# Patient Record
Sex: FEMALE | Race: WHITE | Employment: OTHER | URBAN - METROPOLITAN AREA
[De-identification: names, ages, dates, MRNs, and addresses within clinical notes are randomized per-mention and may not be internally consistent; named-entity substitution may affect disease eponyms.]

---

## 2017-01-30 RX ORDER — ATOMOXETINE 60 MG/1
60 CAPSULE ORAL DAILY
COMMUNITY
Start: 2008-06-09 | End: 2017-03-27

## 2017-01-30 RX ORDER — BUPROPION HYDROCHLORIDE 150 MG/1
150 TABLET ORAL 2 TIMES DAILY
COMMUNITY
Start: 2008-02-26 | End: 2017-08-27

## 2017-01-30 RX ORDER — ESTRADIOL 1 MG/1
1 TABLET ORAL DAILY
COMMUNITY
Start: 2013-12-04 | End: 2018-01-07

## 2017-01-30 RX ORDER — BUDESONIDE AND FORMOTEROL FUMARATE DIHYDRATE 160; 4.5 UG/1; UG/1
AEROSOL RESPIRATORY (INHALATION)
COMMUNITY
Start: 2016-08-17 | End: 2017-03-10

## 2017-01-30 RX ORDER — ESOMEPRAZOLE MAGNESIUM 40 MG/1
40 CAPSULE, DELAYED RELEASE ORAL
COMMUNITY
Start: 2008-01-22 | End: 2017-10-13

## 2017-01-30 RX ORDER — ALBUTEROL SULFATE 90 UG/1
2 AEROSOL, METERED RESPIRATORY (INHALATION) EVERY 4 HOURS PRN
COMMUNITY
Start: 2007-07-31 | End: 2017-12-02

## 2017-01-30 RX ORDER — TRAMADOL HYDROCHLORIDE 50 MG/1
50 TABLET ORAL EVERY 8 HOURS PRN
COMMUNITY
Start: 2015-08-14 | End: 2017-04-29

## 2017-01-30 RX ORDER — ALBUTEROL SULFATE 2.5 MG/3ML
2.5 SOLUTION RESPIRATORY (INHALATION) EVERY 4 HOURS PRN
COMMUNITY
Start: 2013-11-11 | End: 2021-08-30

## 2017-01-30 RX ORDER — MONTELUKAST SODIUM 10 MG/1
10 TABLET ORAL DAILY
COMMUNITY
Start: 2007-01-08 | End: 2017-12-28

## 2017-01-30 RX ORDER — ATORVASTATIN CALCIUM 20 MG/1
20 TABLET, FILM COATED ORAL DAILY
COMMUNITY
Start: 2007-01-08 | End: 2017-10-15

## 2017-01-30 RX ORDER — MIRTAZAPINE 30 MG/1
TABLET, FILM COATED ORAL
COMMUNITY
Start: 2013-11-05 | End: 2017-02-08

## 2017-01-30 RX ORDER — SODIUM PHOSPHATE,MONO-DIBASIC 19G-7G/118
ENEMA (ML) RECTAL
COMMUNITY
Start: 2016-06-10 | End: 2017-03-10

## 2017-02-01 RX ORDER — ATOMOXETINE HCL 60 MG
CAPSULE ORAL
Qty: 90 CAPSULE | Refills: 1 | Status: SHIPPED | OUTPATIENT
Start: 2017-02-01 | End: 2017-02-25 | Stop reason: CLARIF

## 2017-02-08 RX ORDER — MIRTAZAPINE 30 MG/1
TABLET, FILM COATED ORAL
Qty: 90 TABLET | Refills: 1 | Status: SHIPPED | OUTPATIENT
Start: 2017-02-08 | End: 2017-08-07

## 2017-02-14 RX ORDER — MELOXICAM 15 MG/1
15 TABLET ORAL DAILY
Refills: 2 | COMMUNITY
Start: 2017-01-05 | End: 2020-01-28

## 2017-02-14 RX ORDER — FLUTICASONE PROPIONATE 50 MCG
1-2 SPRAY, SUSPENSION (ML) NASAL DAILY
Refills: 1 | COMMUNITY
Start: 2016-12-01 | End: 2017-03-01

## 2017-02-14 RX ORDER — FLUTICASONE PROPIONATE 50 MCG
SPRAY, SUSPENSION (ML) NASAL
Qty: 3 BOTTLE | Refills: 3 | Status: SHIPPED | OUTPATIENT
Start: 2017-02-14 | End: 2017-02-25 | Stop reason: CLARIF

## 2017-02-14 RX ORDER — HYDROXYCHLOROQUINE SULFATE 200 MG/1
200 TABLET, FILM COATED ORAL 2 TIMES DAILY
Refills: 2 | COMMUNITY
Start: 2017-02-03 | End: 2018-06-09 | Stop reason: ALTCHOICE

## 2017-02-14 RX ORDER — FLUTICASONE PROPIONATE 50 MCG
SPRAY, SUSPENSION (ML) NASAL
Qty: 3 BOTTLE | Refills: 3 | Status: SHIPPED | OUTPATIENT
Start: 2017-02-14 | End: 2017-02-14

## 2017-02-14 RX ORDER — FOLIC ACID 1 MG/1
1 TABLET ORAL DAILY
Refills: 11 | COMMUNITY
Start: 2017-01-19 | End: 2020-08-31

## 2017-02-14 RX ORDER — ONDANSETRON 8 MG/1
8 TABLET, ORALLY DISINTEGRATING ORAL 3 TIMES DAILY PRN
Refills: 0 | COMMUNITY
Start: 2016-12-27 | End: 2021-09-28

## 2017-02-25 ENCOUNTER — OFFICE VISIT (OUTPATIENT)
Dept: FAMILY MEDICINE CLINIC | Facility: CLINIC | Age: 52
End: 2017-02-25

## 2017-02-25 VITALS
TEMPERATURE: 99 F | DIASTOLIC BLOOD PRESSURE: 88 MMHG | HEIGHT: 64.75 IN | WEIGHT: 283 LBS | HEART RATE: 68 BPM | SYSTOLIC BLOOD PRESSURE: 148 MMHG | BODY MASS INDEX: 47.73 KG/M2

## 2017-02-25 DIAGNOSIS — F34.1 DYSTHYMIA: ICD-10-CM

## 2017-02-25 DIAGNOSIS — G47.33 OBSTRUCTIVE SLEEP APNEA SYNDROME: Primary | ICD-10-CM

## 2017-02-25 DIAGNOSIS — F41.1 ANXIETY STATE: ICD-10-CM

## 2017-02-25 PROCEDURE — 99214 OFFICE O/P EST MOD 30 MIN: CPT | Performed by: FAMILY MEDICINE

## 2017-02-25 NOTE — PROGRESS NOTES
[facility]        HPI:   This is a 46year old female coming in for Patient presents with:  Obstructive Sleep Apnea (FELISA)    HPI  Patient is doing well with cpap. No problems with hoses tubes filters or mask.    Suman Troy Rfl: 3   Meloxicam 15 MG Oral Tab Take 15 mg by mouth daily. Disp:  Rfl: 2   Hydroxychloroquine Sulfate 200 MG Oral Tab Take 200 mg by mouth 2 (two) times daily. Disp:  Rfl: 2   folic acid 1 MG Oral Tab Take 1 mg by mouth daily.  Disp:  Rfl: 11   Fluticason Glucosamine-Chondroitin ([employer] GLUCOSAMINE-CHONDROITIN) 500-400 MG Oral Cap  Disp:  Rfl:       Counseling given: Not Answered         Problem List:  Patient Active Problem List:     Allergic rhinitis     Anxiety state     S/P appendectomy     Asthma     Bu supple. No JVD present. No tracheal deviation present. No thyromegaly present. Mal 1 tonsil rigt 1+ left 0   Neck 19.5   Cardiovascular: Normal rate, regular rhythm, normal heart sounds and intact distal pulses.     Pulmonary/Chest: Effort normal.   Lymph

## 2017-03-01 RX ORDER — FLUTICASONE PROPIONATE 50 MCG
SPRAY, SUSPENSION (ML) NASAL
Qty: 3 BOTTLE | Refills: 3 | Status: SHIPPED | OUTPATIENT
Start: 2017-03-01 | End: 2017-07-10

## 2017-03-10 ENCOUNTER — TELEPHONE (OUTPATIENT)
Dept: FAMILY MEDICINE CLINIC | Facility: CLINIC | Age: 52
End: 2017-03-10

## 2017-03-10 ENCOUNTER — OFFICE VISIT (OUTPATIENT)
Dept: FAMILY MEDICINE CLINIC | Facility: CLINIC | Age: 52
End: 2017-03-10

## 2017-03-10 VITALS
OXYGEN SATURATION: 100 % | HEIGHT: 64.75 IN | SYSTOLIC BLOOD PRESSURE: 150 MMHG | BODY MASS INDEX: 47.67 KG/M2 | DIASTOLIC BLOOD PRESSURE: 92 MMHG | HEART RATE: 95 BPM | WEIGHT: 282.63 LBS | TEMPERATURE: 99 F

## 2017-03-10 DIAGNOSIS — J45.901 ASTHMA EXACERBATION: ICD-10-CM

## 2017-03-10 DIAGNOSIS — J01.00 ACUTE NON-RECURRENT MAXILLARY SINUSITIS: Primary | ICD-10-CM

## 2017-03-10 DIAGNOSIS — H10.9 BACTERIAL CONJUNCTIVITIS OF LEFT EYE: ICD-10-CM

## 2017-03-10 PROCEDURE — 94150 VITAL CAPACITY TEST: CPT | Performed by: NURSE PRACTITIONER

## 2017-03-10 PROCEDURE — 99214 OFFICE O/P EST MOD 30 MIN: CPT | Performed by: NURSE PRACTITIONER

## 2017-03-10 RX ORDER — POLYMYXIN B SULFATE AND TRIMETHOPRIM 1; 10000 MG/ML; [USP'U]/ML
1 SOLUTION OPHTHALMIC EVERY 6 HOURS
Qty: 10 ML | Refills: 0 | Status: SHIPPED | OUTPATIENT
Start: 2017-03-10 | End: 2017-03-17

## 2017-03-10 RX ORDER — METHYLPREDNISOLONE 4 MG/1
TABLET ORAL
Qty: 1 KIT | Refills: 0 | Status: SHIPPED | OUTPATIENT
Start: 2017-03-10 | End: 2017-11-28 | Stop reason: ALTCHOICE

## 2017-03-10 RX ORDER — DOXYCYCLINE HYCLATE 100 MG
100 TABLET ORAL 2 TIMES DAILY
Qty: 20 TABLET | Refills: 0 | Status: SHIPPED | OUTPATIENT
Start: 2017-03-10 | End: 2017-03-20

## 2017-03-10 RX ORDER — POLYMYXIN B SULFATE AND TRIMETHOPRIM 1; 10000 MG/ML; [USP'U]/ML
1 SOLUTION OPHTHALMIC EVERY 6 HOURS
Qty: 10 ML | Refills: 0 | Status: SHIPPED | OUTPATIENT
Start: 2017-03-10 | End: 2017-03-10

## 2017-03-10 NOTE — PROGRESS NOTES
CHIEF COMPLAINT:   Patient presents with:  Nasal Congestion: pressure   Cough      HPI:   Josefina Dubois is a 46year old female who presents for cold symptoms for  9  days. Symptoms have progressed into sinus congestion and been worsening since onset.  Sin needed. Disp:  Rfl:    Albuterol Sulfate HFA (PROAIR HFA) 108 (90 Base) MCG/ACT Inhalation Aero Soln Inhale 2 puffs into the lungs every 4 (four) hours as needed. Disp:  Rfl:    Atomoxetine HCl (STRATTERA) 60 MG Oral Cap Take 60 mg by mouth daily.    Pola Branhc REVIEW OF SYSTEMS:   GENERAL:  Slightly decreased appetite  SKIN: no rashes or abnormal skin lesions  HEENT: See HPI. LUNGS:  See HPI  CARDIOVASCULAR: denies chest pain or palpitations   GI: denies N/V/C or abdominal pain  NEURO: + sinus headaches. once a day for 4 days. methylPREDNISolone (MEDROL) 4 MG Oral Tablet Therapy Pack 1 kit 0      Sig: As directed. Doxycycline Hyclate 100 MG Oral Tab 20 tablet 0      Sig: Take 1 tablet (100 mg total) by mouth 2 (two) times daily.  For ten days, casey

## 2017-03-10 NOTE — PATIENT INSTRUCTIONS
Push fluids, rest.  Antibiotic as prescribed, take with food. Medrol dose pack as prescribed, take with food. Antibiotic eye drops to eyes as prescribed x 5 days. Hand hygiene important as pink eye is very contagious. Flonase nasal spray as prescribed.

## 2017-03-13 ENCOUNTER — OFFICE VISIT (OUTPATIENT)
Dept: FAMILY MEDICINE CLINIC | Facility: CLINIC | Age: 52
End: 2017-03-13

## 2017-03-13 VITALS
WEIGHT: 278 LBS | BODY MASS INDEX: 47.46 KG/M2 | HEIGHT: 64 IN | DIASTOLIC BLOOD PRESSURE: 92 MMHG | SYSTOLIC BLOOD PRESSURE: 144 MMHG | RESPIRATION RATE: 16 BRPM | TEMPERATURE: 99 F | OXYGEN SATURATION: 99 % | HEART RATE: 108 BPM

## 2017-03-13 DIAGNOSIS — Z09 FOLLOW UP: ICD-10-CM

## 2017-03-13 DIAGNOSIS — J45.901 ASTHMA WITH ACUTE EXACERBATION, UNSPECIFIED ASTHMA SEVERITY: Primary | ICD-10-CM

## 2017-03-13 PROCEDURE — 94150 VITAL CAPACITY TEST: CPT | Performed by: NURSE PRACTITIONER

## 2017-03-13 PROCEDURE — 99213 OFFICE O/P EST LOW 20 MIN: CPT | Performed by: NURSE PRACTITIONER

## 2017-03-13 NOTE — PATIENT INSTRUCTIONS
Pt to continue with current plan of care and finish all antibiotic courses and medrol dose pack as prescribed. Peak flow ordered for home use, use once this episode is resolved to assess baseline peak flow. Low salt diet recommended.   Return if symptoms

## 2017-03-13 NOTE — PROGRESS NOTES
CHIEF COMPLAINT:   Patient presents with: Follow - Up: sinus, URI      HPI:   Angel Franks is a 46year old female who presents for follow up on bacterial conjunctivitis L eye, sinusitis, and asthma exacerbation.  Tx doxycyline, medrol dose pack, and yaneli puffs into the lungs every 4 (four) hours as needed. Disp:  Rfl:    Atomoxetine HCl (STRATTERA) 60 MG Oral Cap Take 60 mg by mouth daily. Disp:  Rfl:    Atorvastatin Calcium (LIPITOR) 20 MG Oral Tab Take 20 mg by mouth daily.    Disp:  Rfl:    BuPROPion HPI    EXAM:   /92 mmHg  Pulse 108  Temp(Src) 98.6 °F (37 °C) (Tympanic)  Resp 16  Ht 64\"  Wt 278 lb  BMI 47.70 kg/m2  SpO2 99%  GENERAL: well developed, well nourished,in no apparent distress  SKIN: no rashes,no suspicious lesions  HEAD: atraumatic 260--which is improved from last visit. Pt will get home peak flow to assess baseline and to use for future exacerbations to assess pulmonary fxn. All questions answered, no new concerns noted.     The patient indicates understanding of these issues and

## 2017-03-28 RX ORDER — ATOMOXETINE HCL 60 MG
CAPSULE ORAL
Qty: 90 CAPSULE | Refills: 1 | Status: SHIPPED | OUTPATIENT
Start: 2017-03-28 | End: 2017-12-04

## 2017-04-18 ENCOUNTER — HOSPITAL ENCOUNTER (OUTPATIENT)
Dept: GENERAL RADIOLOGY | Age: 52
Discharge: HOME | End: 2017-04-18
Attending: NURSE PRACTITIONER
Payer: COMMERCIAL

## 2017-04-18 ENCOUNTER — TELEPHONE (OUTPATIENT)
Dept: FAMILY MEDICINE CLINIC | Facility: CLINIC | Age: 52
End: 2017-04-18

## 2017-04-18 ENCOUNTER — OFFICE VISIT (OUTPATIENT)
Dept: FAMILY MEDICINE CLINIC | Facility: CLINIC | Age: 52
End: 2017-04-18

## 2017-04-18 VITALS
TEMPERATURE: 99 F | DIASTOLIC BLOOD PRESSURE: 98 MMHG | BODY MASS INDEX: 49 KG/M2 | HEART RATE: 79 BPM | WEIGHT: 286.63 LBS | SYSTOLIC BLOOD PRESSURE: 142 MMHG

## 2017-04-18 DIAGNOSIS — M25.571 ACUTE RIGHT ANKLE PAIN: ICD-10-CM

## 2017-04-18 DIAGNOSIS — M25.571 ACUTE RIGHT ANKLE PAIN: Primary | ICD-10-CM

## 2017-04-18 DIAGNOSIS — R60.0 LOCALIZED EDEMA: ICD-10-CM

## 2017-04-18 PROCEDURE — 99214 OFFICE O/P EST MOD 30 MIN: CPT | Performed by: NURSE PRACTITIONER

## 2017-04-18 PROCEDURE — 73610 X-RAY EXAM OF ANKLE: CPT

## 2017-04-18 NOTE — PROGRESS NOTES
Bobby Terry is a 46year old female. Patient presents with:  Pain: right leg, right knee  Leg Swelling      HPI:   Complaints of pain and swelling to right lower leg  Started with ankle pain - then swelling started on Sunday   No recent travel.  No rece (PROAIR HFA) 108 (90 Base) MCG/ACT Inhalation Aero Soln Inhale 2 puffs into the lungs every 4 (four) hours as needed. Disp:  Rfl:    Atorvastatin Calcium (LIPITOR) 20 MG Oral Tab Take 20 mg by mouth daily.    Disp:  Rfl:    BuPROPion HCl ER, XL, [facility] SHADYSIDE-ER REVIEW OF SYSTEMS:   GENERAL HEALTH: feels well otherwise  HEENT: denies complaints  SKIN: denies any unusual skin lesions or rashes  RESPIRATORY: denies shortness of breath with exertion, no cough  CARDIOVASCULAR: denies chest pain on exertion, no

## 2017-04-18 NOTE — PATIENT INSTRUCTIONS
Go to [facility] for an ultrasound of your right lower leg to rule out a blood clot. If you have shortness of breath, chest pain/pressure, severe headache, weakness in happier body, slurred speech, vision changes, etc.–call 911.     If ultrasound

## 2017-05-01 RX ORDER — TRAMADOL HYDROCHLORIDE 50 MG/1
TABLET ORAL
Qty: 30 TABLET | Refills: 0 | Status: SHIPPED | OUTPATIENT
Start: 2017-05-01 | End: 2018-06-14

## 2017-05-01 NOTE — TELEPHONE ENCOUNTER
Last Labs:  12/27/2016  Last OV:  8/16/2016    Future Appointments  Date Time Provider Marlene Bronson   5/24/2017 5:30 PM Nabil Quiros [facility] Tory Cobos, 05/01/2017, 8:10 AM

## 2017-05-30 ENCOUNTER — OFFICE VISIT (OUTPATIENT)
Dept: FAMILY MEDICINE CLINIC | Facility: CLINIC | Age: 52
End: 2017-05-30

## 2017-05-30 VITALS
DIASTOLIC BLOOD PRESSURE: 82 MMHG | TEMPERATURE: 97 F | HEART RATE: 90 BPM | SYSTOLIC BLOOD PRESSURE: 144 MMHG | BODY MASS INDEX: 48.83 KG/M2 | HEIGHT: 64 IN | WEIGHT: 286 LBS | RESPIRATION RATE: 18 BRPM

## 2017-05-30 DIAGNOSIS — H65.93 BILATERAL NON-SUPPURATIVE OTITIS MEDIA: Primary | ICD-10-CM

## 2017-05-30 DIAGNOSIS — J45.30 MILD PERSISTENT ASTHMA WITHOUT COMPLICATION: ICD-10-CM

## 2017-05-30 PROBLEM — H66.90 OTITIS MEDIA: Status: ACTIVE | Noted: 2017-05-30

## 2017-05-30 PROCEDURE — 99213 OFFICE O/P EST LOW 20 MIN: CPT | Performed by: FAMILY MEDICINE

## 2017-05-30 RX ORDER — FEXOFENADINE HCL AND PSEUDOEPHEDRINE HCI 180; 240 MG/1; MG/1
1 TABLET, EXTENDED RELEASE ORAL DAILY
Qty: 10 TABLET | Refills: 0 | Status: SHIPPED | OUTPATIENT
Start: 2017-05-30 | End: 2018-08-14

## 2017-05-30 RX ORDER — CEFUROXIME AXETIL 250 MG/1
250 TABLET ORAL 2 TIMES DAILY
Qty: 20 TABLET | Refills: 0 | Status: SHIPPED | OUTPATIENT
Start: 2017-05-30 | End: 2017-11-28 | Stop reason: ALTCHOICE

## 2017-05-31 NOTE — PROGRESS NOTES
[address]  PROGRESS NOTE  Chief Complaint:   Patient presents with:  Ear Problem: trouble hearing on left side- moving to right side. Slight pain. HPI:   This is a 46year old female coming in for discomfort in her left ear.   She 1   FLUTICASONE PROPIONATE 50 MCG/ACT Nasal Suspension SPRAY 1 TO 2 SPRAYS IN EACH NOSTRIL EVERY DAY Disp: 3 Bottle Rfl: 3   ondansetron 8 MG Oral Tablet Dispersible Take 8 mg by mouth 3 (three) times daily as needed.  Disp:  Rfl: 0   methotrexate 2.5 MG Or Counseling given: Not Answered       REVIEW OF SYSTEMS:   CONSTITUTIONAL:  Denies unusual weight gain/loss, fever, chills, or fatigue. EENT:  Eyes:  Denies eye pain, visual loss, blurred vision, double vision or yellow sclerae.  Ears, Nose, Throat: See H Nose: patent, no nasal discharge Throat:  No tonsillar erythema or exudate. Mouth:  No oral lesions or ulcerations, good dentition. There is mild tenderness across the front part of the face. No specific point tenderness noted.   NECK: Supple, no CLAD, n lipoprotein-type hyperlipidemia     Low back pain     Thoracic neuritis     Symptomatic menopausal or female climacteric states     Obesity     Osteoarthrosis     Sciatica     Sleep apnea     S/P CHRISTINA-BSO (total abdominal hysterectomy and bilateral salpingo

## 2017-07-07 RX ORDER — MOMETASONE FUROATE 220 UG/1
INHALANT RESPIRATORY (INHALATION)
Qty: 1 INHALER | Refills: 0 | Status: SHIPPED | OUTPATIENT
Start: 2017-07-07 | End: 2017-10-09

## 2017-07-10 RX ORDER — FLUTICASONE PROPIONATE 50 MCG
SPRAY, SUSPENSION (ML) NASAL
Qty: 1 BOTTLE | Refills: 6 | Status: SHIPPED | OUTPATIENT
Start: 2017-07-10 | End: 2018-01-29

## 2017-07-18 ENCOUNTER — TELEPHONE (OUTPATIENT)
Dept: FAMILY MEDICINE CLINIC | Facility: CLINIC | Age: 52
End: 2017-07-18

## 2017-07-18 NOTE — TELEPHONE ENCOUNTER
[employer] Approved 5/5/17-7/14/2018.   Phone: [phone]  Fax:    [phone]  Member ID: [id number]

## 2017-08-07 RX ORDER — MIRTAZAPINE 30 MG/1
TABLET, FILM COATED ORAL
Qty: 90 TABLET | Refills: 1 | Status: SHIPPED | OUTPATIENT
Start: 2017-08-07 | End: 2018-02-08

## 2017-08-28 RX ORDER — BUPROPION HYDROCHLORIDE 150 MG/1
TABLET ORAL
Qty: 180 TABLET | Refills: 1 | Status: SHIPPED | OUTPATIENT
Start: 2017-08-28 | End: 2018-02-18

## 2017-08-28 NOTE — TELEPHONE ENCOUNTER
Last Labs:  12/27/2016  Last OV:  8/16/2016  Last RF:  8/13/2016  No future appointments.   Chey Flores, 08/28/17, 7:20 AM

## 2017-10-09 RX ORDER — MOMETASONE FUROATE 220 UG/1
INHALANT RESPIRATORY (INHALATION)
Qty: 3 INHALER | Refills: 3 | Status: SHIPPED | OUTPATIENT
Start: 2017-10-09 | End: 2018-11-06

## 2017-10-09 NOTE — TELEPHONE ENCOUNTER
Future appt:  None  Last Appointment:  5/30/2017; No f/u recommended     No results found for: CHOLEST, HDL, LDL, TRIGLY, TRIG  No results found for: EAG, A1C  No results found for: T4F, TSH, TSHT4    Last Labs:  12/27/2016  Last RF:  7/7/2017    No Follow

## 2017-10-13 RX ORDER — ESOMEPRAZOLE MAGNESIUM 40 MG/1
CAPSULE, DELAYED RELEASE ORAL
Qty: 90 CAPSULE | Refills: 0 | Status: SHIPPED | OUTPATIENT
Start: 2017-10-13 | End: 2018-01-08

## 2017-10-13 NOTE — TELEPHONE ENCOUNTER
Future appt:  None  Last Appointment:  8/16/2016;  Return to clinic in a year  Suggested date of next visit: 08/16/2017    No results found for: CHOLEST, HDL, LDL, TRIGLY, TRIG  No results found for: EAG, A1C  No results found for: T4F, TSH, TSHT4    Last

## 2017-10-16 RX ORDER — ATORVASTATIN CALCIUM 20 MG/1
TABLET, FILM COATED ORAL
Qty: 90 TABLET | Refills: 0 | Status: SHIPPED | OUTPATIENT
Start: 2017-10-16 | End: 2017-12-04

## 2017-11-28 ENCOUNTER — OFFICE VISIT (OUTPATIENT)
Dept: FAMILY MEDICINE CLINIC | Facility: CLINIC | Age: 52
End: 2017-11-28

## 2017-11-28 VITALS
HEART RATE: 74 BPM | HEIGHT: 64 IN | DIASTOLIC BLOOD PRESSURE: 72 MMHG | SYSTOLIC BLOOD PRESSURE: 118 MMHG | WEIGHT: 288 LBS | BODY MASS INDEX: 49.17 KG/M2 | TEMPERATURE: 99 F

## 2017-11-28 DIAGNOSIS — J02.9 PHARYNGITIS, UNSPECIFIED ETIOLOGY: Primary | ICD-10-CM

## 2017-11-28 DIAGNOSIS — K52.9 GASTROENTERITIS: ICD-10-CM

## 2017-11-28 PROCEDURE — 87081 CULTURE SCREEN ONLY: CPT | Performed by: NURSE PRACTITIONER

## 2017-11-28 PROCEDURE — 87880 STREP A ASSAY W/OPTIC: CPT | Performed by: NURSE PRACTITIONER

## 2017-11-28 PROCEDURE — 99214 OFFICE O/P EST MOD 30 MIN: CPT | Performed by: NURSE PRACTITIONER

## 2017-11-28 RX ORDER — CERTOLIZUMAB PEGOL 400 MG
KIT SUBCUTANEOUS
COMMUNITY
Start: 2017-10-19 | End: 2019-10-28 | Stop reason: ALTCHOICE

## 2017-11-28 RX ORDER — CEPHALEXIN 500 MG/1
500 CAPSULE ORAL 2 TIMES DAILY
Qty: 20 CAPSULE | Refills: 0 | Status: SHIPPED | OUTPATIENT
Start: 2017-11-28 | End: 2018-06-09 | Stop reason: ALTCHOICE

## 2017-11-28 NOTE — PROGRESS NOTES
Chief complaint:  Patient presents with:  Sore Throat: x 6 days - denies fever  Ear Pain: x 6 days  Vomiting: x 4 days  Diarrhea: x 4 days      HPI:   Sachi Gibson is a 45year old female who presents for upper respiratory symptoms for 6  days.   Complain BUPROPION HCL ER, XL, 150 MG Oral Tablet 24 Hr TAKE 1 TABLET TWICE A DAY Disp: 180 tablet Rfl: 1   MIRTAZAPINE 30 MG Oral Tab TAKE 1 TABLET BY MOUTH EVERY NIGHT AT BEDTIME Disp: 90 tablet Rfl: 1   FLUTICASONE PROPIONATE 50 MCG/ACT Nasal Suspension SHAKE LI • Obesity    • Sleep apnea       Past Surgical History:  No date: APPENDECTOMY  No date:   No date: HYSTERECTOMY   No family history on file.    Smoking status: Never Smoker                                                              Smokeless tob Kit Expiration Date 5/31/2019 Date       Strep throat cx pending. ASSESSMENT AND PLAN:   Deng Gonsales is a 45year old female who presents with Sore Throat (x 6 days - denies fever); Ear Pain (x 6 days); Vomiting (x 4 days); and Diarrhea (x 4 days).  Hortensia Kellogg Gargling every hour or 2 can ease irritation.  Try gargling with 1 of these solutions:  · 1/4 teaspoon of salt in 1/2 cup of warm water  · An over-the-counter anesthetic gargle  Use medicine for more relief  Over-the-counter medicine can reduce sore throat Gastroenteritis is commonly called the stomach flu. It is most often caused by a virus that affects the stomach and intestinal tract and usually lasts from 2 to 7 days. Common viruses causing gastroenteritis include norovirus, rotavirus, and hepatitis A.  Alexandrea Montelongo You may use acetaminophen or NSAID medicines like ibuprofen or naproxen to control fever unless another medicine was given. If you have chronic liver or kidney disease, talk with your healthcare provider before using these medicines.  Also talk with your pr · Limit fat intake to less than 15 grams per day. Do this by avoiding margarine, butter, oils, mayonnaise, sauces, gravies, fried foods, peanut butter, meat, poultry, and fish.   · Limit fiber and avoid raw or cooked vegetables, fresh fruits (except bananas Rapid strep negative, strep throat cx pending. The patient indicates understanding of these issues and agrees to the plan. The patient is asked to return with PCP if sx's persist or worsen.     LAURA Pennington

## 2017-11-28 NOTE — PATIENT INSTRUCTIONS
Push fluids, rest. Hydration is important with water and low sugar gatorade. Diarrhea should improve over the next few days, do NOT recommend imodium at this time. Antibiotic as prescribed, take with food. Anderson diet and progress as tolerated.    Tylenol · Stop smoking or reduce contact with secondhand smoke. Smoke irritates the tender throat lining. · Limit contact with pets and with allergy-causing substances, such as pollen and mold.   · When you’re around someone with a sore throat or cold, wash your h Gastroenteritis is transmitted by contact with the stool or vomit of an infected person. This can occur from person to person or from contact with a contaminated surface.   Follow these guidelines when caring for yourself at home:  · If symptoms are severe, · If you eat, avoid fatty, greasy, spicy, or fried foods. · Don't eat dairy if you have diarrhea. This can make diarrhea worse. · Avoid tobacco, alcohol, and caffeine which may worsen symptoms.   During the first 24 hours (the first full day), follow the Call 911 if any of these occur:  · Trouble breathing  · Chest pain  · Confused  · Severe drowsiness or trouble awakening  · Fainting or loss of consciousness  · Rapid heart rate  · Seizure  · Stiff neck  When to seek medical advice  Call your healthcare pr

## 2017-12-02 RX ORDER — ALBUTEROL SULFATE 90 UG/1
2 AEROSOL, METERED RESPIRATORY (INHALATION) EVERY 4 HOURS PRN
Qty: 3 INHALER | Refills: 3 | Status: SHIPPED | OUTPATIENT
Start: 2017-12-02 | End: 2019-05-07

## 2017-12-02 NOTE — TELEPHONE ENCOUNTER
Future appt:    Last Appointment:  05/30/2017  No results found for: CHOLEST, HDL, LDL, TRIGLY, TRIG  No results found for: EAG, A1C  No results found for: T4F, TSH, TSHT4    No Follow-up on file.

## 2017-12-05 RX ORDER — ATORVASTATIN CALCIUM 20 MG/1
TABLET, FILM COATED ORAL
Qty: 90 TABLET | Refills: 0 | Status: SHIPPED | OUTPATIENT
Start: 2017-12-05 | End: 2018-06-09

## 2017-12-05 RX ORDER — ATOMOXETINE 60 MG/1
CAPSULE ORAL
Qty: 90 CAPSULE | Refills: 0 | Status: SHIPPED | OUTPATIENT
Start: 2017-12-05 | End: 2018-06-04

## 2017-12-05 NOTE — TELEPHONE ENCOUNTER
Future appt:    Last Appointment:  5/30/2017   Last Physical 8/16/2016  No results found for: CHOLEST, HDL, LDL, TRIGLY, TRIG  8/14/2016  No results found for: EAG, A1C  No results found for: T4F, TSH, TSHT4    No Follow-up on file.    M/L due for Fasting L

## 2017-12-30 RX ORDER — MONTELUKAST SODIUM 10 MG/1
TABLET ORAL
Qty: 90 TABLET | Refills: 3 | Status: SHIPPED | OUTPATIENT
Start: 2017-12-30 | End: 2018-11-14

## 2018-01-08 RX ORDER — ESTRADIOL 1 MG/1
TABLET ORAL
Qty: 90 TABLET | Refills: 3 | Status: SHIPPED | OUTPATIENT
Start: 2018-01-08 | End: 2018-12-28

## 2018-01-08 RX ORDER — ESOMEPRAZOLE MAGNESIUM 40 MG/1
CAPSULE, DELAYED RELEASE ORAL
Qty: 90 CAPSULE | Refills: 3 | Status: SHIPPED | OUTPATIENT
Start: 2018-01-08 | End: 2019-01-05

## 2018-01-08 NOTE — TELEPHONE ENCOUNTER
Future appt:  None  Last Appointment:  5/30/2017; No f/u recommended     No results found for: CHOLEST, HDL, LDL, TRIGLY, TRIG  No results found for: EAG, A1C  No results found for: T4F, TSH, TSHT4     Last Labs:  12/27/2016  Last RF:  1/3/2017     No Foll

## 2018-01-08 NOTE — TELEPHONE ENCOUNTER
Future appt:  None  Last Appointment:  5/30/2017; No f/u recommended    No results found for: CHOLEST, HDL, LDL, TRIGLY, TRIG  No results found for: EAG, A1C  No results found for: T4F, TSH, TSHT4    Last Labs:  12/27/2016  Last RF:  1/3/2017    No Follow-

## 2018-01-29 RX ORDER — FLUTICASONE PROPIONATE 50 MCG
SPRAY, SUSPENSION (ML) NASAL
Qty: 3 BOTTLE | Refills: 1 | Status: SHIPPED | OUTPATIENT
Start: 2018-01-29 | End: 2018-06-09

## 2018-01-29 NOTE — TELEPHONE ENCOUNTER
Future appt:    Last Appointment:  5/30/2017  No results found for: CHOLEST, HDL, LDL, TRIGLY, TRIG  No results found for: EAG, A1C  No results found for: T4F, TSH, TSHT4    No Follow-up on file.

## 2018-02-08 RX ORDER — MIRTAZAPINE 30 MG/1
TABLET, FILM COATED ORAL
Qty: 90 TABLET | Refills: 0 | Status: SHIPPED | OUTPATIENT
Start: 2018-02-08 | End: 2018-05-07

## 2018-02-08 NOTE — TELEPHONE ENCOUNTER
Future appt:  None  Last Appointment:  5/30/2017; No f/u recommended    No results found for: CHOLEST, HDL, LDL, TRIGLY, TRIG  No results found for: EAG, A1C  No results found for: T4F, TSH, TSHT4    Last Labs:  12/27/2016  Last RF:  8/7/2017    No Follow-

## 2018-02-19 NOTE — TELEPHONE ENCOUNTER
Future appt:  None  Last Appointment:  5/30/2017; No f/u recommended    No results found for: CHOLEST, HDL, LDL, TRIGLY, TRIG  No results found for: EAG, A1C  No results found for: T4F, TSH, TSHT4    Last Labs:  12/27/2016  Last RF:  12/5/2017 and 8/28/291

## 2018-02-20 RX ORDER — ATORVASTATIN CALCIUM 20 MG/1
20 TABLET, FILM COATED ORAL DAILY
Qty: 90 TABLET | Refills: 1 | Status: SHIPPED | OUTPATIENT
Start: 2018-02-20 | End: 2019-12-10

## 2018-02-20 RX ORDER — BUPROPION HYDROCHLORIDE 150 MG/1
150 TABLET ORAL 2 TIMES DAILY
Qty: 180 TABLET | Refills: 1 | Status: SHIPPED | OUTPATIENT
Start: 2018-02-20 | End: 2020-08-31

## 2018-04-18 ENCOUNTER — OFFICE VISIT (OUTPATIENT)
Dept: FAMILY MEDICINE CLINIC | Facility: CLINIC | Age: 53
End: 2018-04-18

## 2018-04-18 VITALS
BODY MASS INDEX: 51 KG/M2 | SYSTOLIC BLOOD PRESSURE: 136 MMHG | OXYGEN SATURATION: 98 % | TEMPERATURE: 98 F | WEIGHT: 293 LBS | DIASTOLIC BLOOD PRESSURE: 88 MMHG | HEART RATE: 97 BPM

## 2018-04-18 DIAGNOSIS — B02.8 HERPES ZOSTER WITH COMPLICATION: Primary | ICD-10-CM

## 2018-04-18 PROCEDURE — 87798 DETECT AGENT NOS DNA AMP: CPT | Performed by: NURSE PRACTITIONER

## 2018-04-18 PROCEDURE — 99214 OFFICE O/P EST MOD 30 MIN: CPT | Performed by: NURSE PRACTITIONER

## 2018-04-18 RX ORDER — VALACYCLOVIR HYDROCHLORIDE 1 G/1
1 TABLET, FILM COATED ORAL 3 TIMES DAILY
Qty: 21 TABLET | Refills: 0 | Status: SHIPPED | OUTPATIENT
Start: 2018-04-18 | End: 2018-04-25

## 2018-04-18 NOTE — PROGRESS NOTES
CHIEF COMPLAINT:   Patient presents with:  Pain: \"face pain\" blisters on mouth      HPI:   Jacklyn Gil is a 45year old female who presents to clinic today with complaints of pain and itching to left eye, then tingling down left side of face now bli MG Oral Tab Take 1 tablet (20 mg total) by mouth daily.  Disp: 90 tablet Rfl: 1   ATOMOXETINE HCL 60 MG Oral Cap TAKE 1 CAPSULE DAILY Disp: 90 capsule Rfl: 0   Albuterol Sulfate HFA (PROAIR HFA) 108 (90 Base) MCG/ACT Inhalation Aero Soln Inhale 2 puffs into size, no nodules  LUNGS: No cough, shortness of breath, or wheezing. CARDIOVASCULAR: No chest pain, palpitations  GI: No N/V/C/D.   NEURO: denies complaints    EXAM:   /88 (BP Location: Left arm, Patient Position: Sitting, Cuff Size: adult)   Pulse 9 post herpetic neuralgia and encouraged to follow up if pain persists after rash subsides. Patient voiced understanding and is in agreement with treatment plan.         Meds & Refills for this Visit:    Signed Prescriptions Disp Refills    Frederich Crigler

## 2018-04-23 ENCOUNTER — TELEPHONE (OUTPATIENT)
Dept: FAMILY MEDICINE CLINIC | Facility: CLINIC | Age: 53
End: 2018-04-23

## 2018-04-23 NOTE — TELEPHONE ENCOUNTER
Patient informed of recommendations. Expressed understanding. Patient states she did see the eye doctor. Was given a gel rx for eye     Form placed at  for patient to .

## 2018-04-23 NOTE — TELEPHONE ENCOUNTER
Was seen by Godfrey Gan recently. Has shingles--requesting a note for work for a couple days. Please call back.

## 2018-04-23 NOTE — TELEPHONE ENCOUNTER
Patient states she saw Jayson Osorio last week and was dx with shingles. All over her face.    Has been home on work injury and has been released back to work but patient does not want to go back to work with it [address] over her face  Patient is requesting a work exc

## 2018-04-23 NOTE — TELEPHONE ENCOUNTER
Note printed please give a copy to patient -also please asked patient if she saw the eye doctor for an evaluation if not she needs to see the eye doctor soon as possible for the shingles.

## 2018-04-23 NOTE — TELEPHONE ENCOUNTER
\"Please excuse Sanchez Bennett from work until her shingles rash has dried. She is contagious until lesions have crusted over. \"  Is this ok for her letter.

## 2018-04-26 ENCOUNTER — TELEPHONE (OUTPATIENT)
Dept: FAMILY MEDICINE CLINIC | Facility: CLINIC | Age: 53
End: 2018-04-26

## 2018-04-26 NOTE — TELEPHONE ENCOUNTER
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 Had questions regarding dx, meds, symptoms, office notes, information  Given to nurse.    Expressed understanding

## 2018-04-27 ENCOUNTER — TELEPHONE (OUTPATIENT)
Dept: FAMILY MEDICINE CLINIC | Facility: CLINIC | Age: 53
End: 2018-04-27

## 2018-04-27 NOTE — TELEPHONE ENCOUNTER
Patient having a lot of left ear pain. States keeping Her up at night. Saw Tasneem Graham 4/18 for Shingles. Please advise.   Yesica Jones, 04/27/18, 8:44 AM

## 2018-04-27 NOTE — TELEPHONE ENCOUNTER
Ear pain can be associated with the shingles. I would recommend using over-the-counter hydrocortisone cream on the area if she can get to it. If not sometimes drops of oil in the ear are helpful for that type of pain.   If the pain continues to get progre

## 2018-05-07 RX ORDER — MIRTAZAPINE 30 MG/1
TABLET, FILM COATED ORAL
Qty: 90 TABLET | Refills: 0 | Status: SHIPPED | OUTPATIENT
Start: 2018-05-07 | End: 2018-08-03

## 2018-05-07 NOTE — TELEPHONE ENCOUNTER
Future appt:    Last Appointment:  5/30/2017; No f/u recommended    No results found for: CHOLEST, HDL, LDL, TRIGLY, TRIG  No results found for: EAG, A1C  No results found for: T4F, TSH, TSHT4    Last RF:  2/8/2018    No Follow-up on file.

## 2018-05-07 NOTE — TELEPHONE ENCOUNTER
M/L on VM Patient due for Appt with Dr Annia Cueva to continue refills.   Nichole Araiza, 05/07/18, 3:10 PM

## 2018-06-04 NOTE — TELEPHONE ENCOUNTER
Future appt:    Last Appointment:  5/30/2017; No f/u recommended    No results found for: CHOLEST, HDL, LDL, TRIGLY, TRIG  No results found for: EAG, A1C  No results found for: T4F, TSH, TSHT4    Last RF:  12/5/2017    No Follow-up on file.

## 2018-06-05 RX ORDER — ATOMOXETINE 60 MG/1
CAPSULE ORAL
Qty: 90 CAPSULE | Refills: 0 | Status: SHIPPED | OUTPATIENT
Start: 2018-06-05 | End: 2018-09-17

## 2018-06-09 ENCOUNTER — OFFICE VISIT (OUTPATIENT)
Dept: FAMILY MEDICINE CLINIC | Facility: CLINIC | Age: 53
End: 2018-06-09

## 2018-06-09 ENCOUNTER — LAB ENCOUNTER (OUTPATIENT)
Dept: LAB | Age: 53
End: 2018-06-09
Attending: FAMILY MEDICINE
Payer: COMMERCIAL

## 2018-06-09 VITALS
BODY MASS INDEX: 50.02 KG/M2 | DIASTOLIC BLOOD PRESSURE: 78 MMHG | WEIGHT: 293 LBS | HEART RATE: 108 BPM | HEIGHT: 64 IN | RESPIRATION RATE: 20 BRPM | SYSTOLIC BLOOD PRESSURE: 152 MMHG | TEMPERATURE: 98 F

## 2018-06-09 DIAGNOSIS — E78.49 FAMILIAL MULTIPLE LIPOPROTEIN-TYPE HYPERLIPIDEMIA: ICD-10-CM

## 2018-06-09 DIAGNOSIS — M05.79 RHEUMATOID ARTHRITIS INVOLVING MULTIPLE SITES WITH POSITIVE RHEUMATOID FACTOR (HCC): ICD-10-CM

## 2018-06-09 DIAGNOSIS — M54.50 CHRONIC MIDLINE LOW BACK PAIN WITHOUT SCIATICA: ICD-10-CM

## 2018-06-09 DIAGNOSIS — G47.33 OBSTRUCTIVE SLEEP APNEA SYNDROME: ICD-10-CM

## 2018-06-09 DIAGNOSIS — E55.9 VITAMIN D DEFICIENCY: ICD-10-CM

## 2018-06-09 DIAGNOSIS — G89.29 CHRONIC MIDLINE LOW BACK PAIN WITHOUT SCIATICA: ICD-10-CM

## 2018-06-09 DIAGNOSIS — M15.9 PRIMARY OSTEOARTHRITIS INVOLVING MULTIPLE JOINTS: ICD-10-CM

## 2018-06-09 DIAGNOSIS — J45.40 MODERATE PERSISTENT ASTHMA WITHOUT COMPLICATION: ICD-10-CM

## 2018-06-09 DIAGNOSIS — Z00.01 ENCOUNTER FOR GENERAL ADULT MEDICAL EXAMINATION WITH ABNORMAL FINDINGS: Primary | ICD-10-CM

## 2018-06-09 DIAGNOSIS — F33.1 MODERATE EPISODE OF RECURRENT MAJOR DEPRESSIVE DISORDER (HCC): ICD-10-CM

## 2018-06-09 DIAGNOSIS — Z00.01 ENCOUNTER FOR GENERAL ADULT MEDICAL EXAMINATION WITH ABNORMAL FINDINGS: ICD-10-CM

## 2018-06-09 DIAGNOSIS — J30.1 SEASONAL ALLERGIC RHINITIS DUE TO POLLEN: ICD-10-CM

## 2018-06-09 DIAGNOSIS — E66.01 CLASS 3 SEVERE OBESITY DUE TO EXCESS CALORIES WITH SERIOUS COMORBIDITY AND BODY MASS INDEX (BMI) OF 50.0 TO 59.9 IN ADULT (HCC): ICD-10-CM

## 2018-06-09 PROBLEM — H66.90 OTITIS MEDIA: Status: RESOLVED | Noted: 2017-05-30 | Resolved: 2018-06-09

## 2018-06-09 PROCEDURE — 84550 ASSAY OF BLOOD/URIC ACID: CPT | Performed by: FAMILY MEDICINE

## 2018-06-09 PROCEDURE — 99396 PREV VISIT EST AGE 40-64: CPT | Performed by: FAMILY MEDICINE

## 2018-06-09 PROCEDURE — 82306 VITAMIN D 25 HYDROXY: CPT | Performed by: FAMILY MEDICINE

## 2018-06-09 PROCEDURE — 36415 COLL VENOUS BLD VENIPUNCTURE: CPT | Performed by: FAMILY MEDICINE

## 2018-06-09 PROCEDURE — 99214 OFFICE O/P EST MOD 30 MIN: CPT | Performed by: FAMILY MEDICINE

## 2018-06-09 PROCEDURE — 80050 GENERAL HEALTH PANEL: CPT | Performed by: FAMILY MEDICINE

## 2018-06-09 PROCEDURE — 80061 LIPID PANEL: CPT | Performed by: FAMILY MEDICINE

## 2018-06-09 RX ORDER — FLUTICASONE PROPIONATE 50 MCG
2 SPRAY, SUSPENSION (ML) NASAL DAILY
Qty: 3 BOTTLE | Refills: 3 | Status: SHIPPED | OUTPATIENT
Start: 2018-06-09 | End: 2019-05-07

## 2018-06-09 NOTE — PROGRESS NOTES
[facility] [place]  PROGRESS NOTE  Chief Complaint:   Patient presents with:  Physical      HPI:   This is a 45year old female coming in for her annual wellness exam.    She is frustrated because she hurts all over.   She has joint aches in her Sulfa Antibiotics         Current Meds:    Current Outpatient Prescriptions:  Fluticasone Propionate 50 MCG/ACT Nasal Suspension 2 sprays by Nasal route daily. Disp: 3 Bottle Rfl: 3   ATOMOXETINE HCL 60 MG Oral Cap TAKE 1 CAPSULE DAILY.  DUE  FOR FASTING Carbonyl Iron (FEOSOL) 45 MG Oral Tab Take 45 mg by mouth 2 (two) times daily.    Disp:  Rfl:    Nebulizers (AIRIAL COMPACT COMPRESSOR NEB) Does not apply Misc  Disp:  Rfl:    Nebulizers (NEBULIZER COMPRESSOR) Does not apply Misc  Disp:  Rfl:    Fexofenad 50.77 kg/m² as calculated from the following:    Height as of this encounter: 64\". Weight as of this encounter: 295 lb 12.8 oz. Vital signs reviewed. Appears stated age, well groomed.   Physical Exam:  GEN:  Patient is alert, awake and oriented, well 59.9 in adult [facility])  Her obesity is getting slowly and progressively worse. She is aware of the fact but frustrated because she is unable to exercise. She will continue to work on decreasing her portion sizes.     3. Seasonal allergic rhinitis due to poll sciatica. Plan: Referral to the spine center at [address] [place] [address] to see if there is any additional therapy that can be done for her. - PAIN MANAGEMENT - EXTERNAL    11. Moderate persistent asthma without complication  Her asthma is well controlled.

## 2018-06-11 ENCOUNTER — TELEPHONE (OUTPATIENT)
Dept: FAMILY MEDICINE CLINIC | Facility: CLINIC | Age: 53
End: 2018-06-11

## 2018-06-11 NOTE — TELEPHONE ENCOUNTER
----- Message from Daryl Fay MD sent at 6/11/2018  8:49 AM CDT -----  Very good news. The blood work came back looking completely normal.  Vitamin D level is normal at 32.8. Please continue to take the vitamin D supplement as it is working well.

## 2018-06-15 RX ORDER — TRAMADOL HYDROCHLORIDE 50 MG/1
TABLET ORAL
Qty: 30 TABLET | Refills: 1 | Status: SHIPPED
Start: 2018-06-15 | End: 2018-12-11

## 2018-06-15 NOTE — TELEPHONE ENCOUNTER
Future appt:    Last Appointment:  Visit date not found    Cholesterol, Total (mg/dL)   Date Value   06/09/2018 163   ----------  HDL Cholesterol (mg/dL)   Date Value   06/09/2018 63   ----------  LDL Cholesterol (mg/dL)   Date Value   06/09/2018 81   ----

## 2018-06-18 NOTE — TELEPHONE ENCOUNTER
Future appt:    Last Appointment: 6/9/2018  Dr Sylvia Hale    Cholesterol, Total (mg/dL)   Date Value   06/09/2018 163   ----------  HDL Cholesterol (mg/dL)   Date Value   06/09/2018 63   ----------  LDL Cholesterol (mg/dL)   Date Value   06/09/2018 81   ----

## 2018-08-03 RX ORDER — MIRTAZAPINE 30 MG/1
TABLET, FILM COATED ORAL
Qty: 90 TABLET | Refills: 0 | Status: SHIPPED | OUTPATIENT
Start: 2018-08-03 | End: 2018-10-31

## 2018-08-03 NOTE — TELEPHONE ENCOUNTER
Future appt:     Your appointments     Date & Time Appointment Department [facility])    Aug 18, 2018  8:40 AM CDT Sleep Follow Up with Milo Ray MD [address], [place])        [facility],

## 2018-08-14 ENCOUNTER — OFFICE VISIT (OUTPATIENT)
Dept: FAMILY MEDICINE CLINIC | Facility: CLINIC | Age: 53
End: 2018-08-14
Payer: COMMERCIAL

## 2018-08-14 ENCOUNTER — TELEPHONE (OUTPATIENT)
Dept: FAMILY MEDICINE CLINIC | Facility: CLINIC | Age: 53
End: 2018-08-14

## 2018-08-14 ENCOUNTER — APPOINTMENT (OUTPATIENT)
Dept: LAB | Age: 53
End: 2018-08-14
Attending: NURSE PRACTITIONER
Payer: COMMERCIAL

## 2018-08-14 VITALS
TEMPERATURE: 98 F | SYSTOLIC BLOOD PRESSURE: 134 MMHG | WEIGHT: 287 LBS | HEART RATE: 100 BPM | DIASTOLIC BLOOD PRESSURE: 94 MMHG | BODY MASS INDEX: 49 KG/M2 | RESPIRATION RATE: 16 BRPM

## 2018-08-14 DIAGNOSIS — M54.50 CHRONIC MIDLINE LOW BACK PAIN WITHOUT SCIATICA: ICD-10-CM

## 2018-08-14 DIAGNOSIS — R32 URINARY INCONTINENCE, UNSPECIFIED TYPE: Primary | ICD-10-CM

## 2018-08-14 DIAGNOSIS — G89.29 CHRONIC MIDLINE LOW BACK PAIN WITHOUT SCIATICA: ICD-10-CM

## 2018-08-14 LAB
ALBUMIN SERPL-MCNC: 3.8 G/DL (ref 3.5–4.8)
ALBUMIN/GLOB SERPL: 1.1 {RATIO} (ref 1–2)
ALP LIVER SERPL-CCNC: 54 U/L (ref 41–108)
ALT SERPL-CCNC: 32 U/L (ref 14–54)
ANION GAP SERPL CALC-SCNC: 8 MMOL/L (ref 0–18)
APPEARANCE: CLEAR
AST SERPL-CCNC: 20 U/L (ref 15–41)
BILIRUB SERPL-MCNC: 0.4 MG/DL (ref 0.1–2)
BUN BLD-MCNC: 22 MG/DL (ref 8–20)
BUN/CREAT SERPL: 23.7 (ref 10–20)
CALCIUM BLD-MCNC: 9.1 MG/DL (ref 8.3–10.3)
CHLORIDE SERPL-SCNC: 108 MMOL/L (ref 101–111)
CO2 SERPL-SCNC: 27 MMOL/L (ref 22–32)
CREAT BLD-MCNC: 0.93 MG/DL (ref 0.55–1.02)
GLOBULIN PLAS-MCNC: 3.5 G/DL (ref 2.5–3.7)
GLUCOSE (URINE DIPSTICK): NEGATIVE MG/DL
GLUCOSE BLD-MCNC: 79 MG/DL (ref 70–99)
KETONES (URINE DIPSTICK): 15 MG/DL
LEUKOCYTES: NEGATIVE
M PROTEIN MFR SERPL ELPH: 7.3 G/DL (ref 6.1–8.3)
MULTISTIX LOT#: NORMAL NUMERIC
NITRITE, URINE: NEGATIVE
OCCULT BLOOD: NEGATIVE
OSMOLALITY SERPL CALC.SUM OF ELEC: 298 MOSM/KG (ref 275–295)
PH, URINE: 5 (ref 4.5–8)
POTASSIUM SERPL-SCNC: 4.5 MMOL/L (ref 3.6–5.1)
PROTEIN (URINE DIPSTICK): 30 MG/DL
SODIUM SERPL-SCNC: 143 MMOL/L (ref 136–144)
SPECIFIC GRAVITY: >=1.03 (ref 1–1.03)
URINE-COLOR: YELLOW
UROBILINOGEN,SEMI-QN: 0.2 MG/DL (ref 0–1.9)

## 2018-08-14 PROCEDURE — 80053 COMPREHEN METABOLIC PANEL: CPT | Performed by: NURSE PRACTITIONER

## 2018-08-14 PROCEDURE — 99213 OFFICE O/P EST LOW 20 MIN: CPT | Performed by: NURSE PRACTITIONER

## 2018-08-14 PROCEDURE — 36415 COLL VENOUS BLD VENIPUNCTURE: CPT | Performed by: NURSE PRACTITIONER

## 2018-08-14 PROCEDURE — 81003 URINALYSIS AUTO W/O SCOPE: CPT | Performed by: NURSE PRACTITIONER

## 2018-08-14 NOTE — PROGRESS NOTES
HPI:   Patient presents with: Incontinence  Urinary Urgency       Shameka Sanchez is a 45year old female who complains of frequency, incontinence and urgency. Has been eating a little more protein and having protein drinks.  Mostly the incontinence is in t

## 2018-08-14 NOTE — TELEPHONE ENCOUNTER
Patient states she is having urinary frequency without much urine. Requesting UTI evaluation. Appt given today 2:30 with Vijaya WEAVER for evalaution.   Wang Antoine, 08/14/18, 11:15 AM

## 2018-08-14 NOTE — PATIENT INSTRUCTIONS
Urine normal.   CMP pending (checking kidney function). Let Dr. Segundo Page know of the symptoms. Follow-up as needed.

## 2018-08-18 ENCOUNTER — OFFICE VISIT (OUTPATIENT)
Dept: FAMILY MEDICINE CLINIC | Facility: CLINIC | Age: 53
End: 2018-08-18
Payer: COMMERCIAL

## 2018-08-18 VITALS
WEIGHT: 285.19 LBS | BODY MASS INDEX: 48.69 KG/M2 | HEART RATE: 94 BPM | TEMPERATURE: 99 F | DIASTOLIC BLOOD PRESSURE: 76 MMHG | HEIGHT: 64 IN | SYSTOLIC BLOOD PRESSURE: 118 MMHG | RESPIRATION RATE: 20 BRPM | OXYGEN SATURATION: 98 %

## 2018-08-18 DIAGNOSIS — G47.33 OBSTRUCTIVE SLEEP APNEA SYNDROME: Primary | ICD-10-CM

## 2018-08-18 PROCEDURE — 99214 OFFICE O/P EST MOD 30 MIN: CPT | Performed by: FAMILY MEDICINE

## 2018-08-18 NOTE — PATIENT INSTRUCTIONS
You have been scheduled for a sleep study at the [address] [place] [address]. You will receive a phone call from the Sleep lab to review forms and will receive forms to fill out in the mail.      You should have heard from our precert department within the next

## 2018-08-18 NOTE — PROGRESS NOTES
[facility] [place]  SLEEP PROGRESS NOTE        HPI:   This is a 45year old female coming in for Patient presents with: Follow - Up: lazaro      HPI:   Pt has had a dry mouth but needs new equipment. Last sleep study was also in 2012.    States s Yes           0.0 oz/week     Comment: once or twice a year    Family History:  No family history on file.   Allergies:    Nitrofurantoin            Norfloxacin               Penicillin G              Phenobarbital             Sulfa Antibiotics         Curr daily. Disp:  Rfl: 2   folic acid 1 MG Oral Tab Take 1 mg by mouth daily. Disp:  Rfl: 11   albuterol sulfate (2.5 MG/3ML) 0.083% Inhalation Nebu Soln Take 2.5 mg by nebulization every 4 (four) hours as needed.    Disp:  Rfl:    Carbonyl Iron (FEOSOL) 45 MG 98%   BMI 48.95 kg/m²  Estimated body mass index is 48.95 kg/m² as calculated from the following:    Height as of this encounter: 64\". Weight as of this encounter: 285 lb 3.2 oz.    Neck in inches: Neck Circumferenece: 18.5    Wt Readings from Last 6 En turning at night. To see if there is bring awakenings not associated with desaturations. I will follow-up pending those results. Patient is due for a new machine will send her new equipment to [facility] once available.     - GENERAL SLEEP STUDY TRANSCRIP

## 2018-09-04 RX ORDER — BUPROPION HYDROCHLORIDE 150 MG/1
TABLET ORAL
Qty: 180 TABLET | Refills: 1 | Status: SHIPPED | OUTPATIENT
Start: 2018-09-04 | End: 2018-09-17

## 2018-09-04 RX ORDER — ATORVASTATIN CALCIUM 20 MG/1
20 TABLET, FILM COATED ORAL NIGHTLY
Qty: 90 TABLET | Refills: 1 | Status: SHIPPED | OUTPATIENT
Start: 2018-09-04 | End: 2018-09-17

## 2018-09-04 RX ORDER — ATOMOXETINE 60 MG/1
60 CAPSULE ORAL DAILY
Qty: 90 CAPSULE | Refills: 1 | Status: SHIPPED | OUTPATIENT
Start: 2018-09-04 | End: 2020-08-31

## 2018-09-04 NOTE — TELEPHONE ENCOUNTER
Future appt:     Your appointments     Date & Time Appointment Department [facility])    Sep 20, 2018  8:00 PM CDT CPAP Tritation Study with [facility] ([address] [place] [address])        [address] [place] [address]

## 2018-09-17 ENCOUNTER — APPOINTMENT (OUTPATIENT)
Dept: LAB | Age: 53
End: 2018-09-17
Attending: FAMILY MEDICINE
Payer: COMMERCIAL

## 2018-09-17 ENCOUNTER — OFFICE VISIT (OUTPATIENT)
Dept: FAMILY MEDICINE CLINIC | Facility: CLINIC | Age: 53
End: 2018-09-17
Payer: COMMERCIAL

## 2018-09-17 VITALS
TEMPERATURE: 98 F | HEART RATE: 96 BPM | RESPIRATION RATE: 20 BRPM | SYSTOLIC BLOOD PRESSURE: 144 MMHG | DIASTOLIC BLOOD PRESSURE: 80 MMHG | BODY MASS INDEX: 48.83 KG/M2 | WEIGHT: 286 LBS | HEIGHT: 64 IN | OXYGEN SATURATION: 97 %

## 2018-09-17 DIAGNOSIS — R42 DIZZINESS: ICD-10-CM

## 2018-09-17 DIAGNOSIS — R53.83 OTHER FATIGUE: ICD-10-CM

## 2018-09-17 DIAGNOSIS — R11.0 NAUSEA: Primary | ICD-10-CM

## 2018-09-17 LAB
APPEARANCE: CLEAR
BASOPHILS # BLD AUTO: 0.06 X10(3) UL (ref 0–0.1)
BASOPHILS NFR BLD AUTO: 0.8 %
BILIRUBIN: NEGATIVE
EOSINOPHIL # BLD AUTO: 0.07 X10(3) UL (ref 0–0.3)
EOSINOPHIL NFR BLD AUTO: 0.9 %
ERYTHROCYTE [DISTWIDTH] IN BLOOD BY AUTOMATED COUNT: 13.7 % (ref 11.5–16)
GLUCOSE (URINE DIPSTICK): NEGATIVE MG/DL
HCT VFR BLD AUTO: 47.2 % (ref 34–50)
HGB BLD-MCNC: 14.1 G/DL (ref 12–16)
IMMATURE GRANULOCYTE COUNT: 0.03 X10(3) UL (ref 0–1)
IMMATURE GRANULOCYTE RATIO %: 0.4 %
KETONES (URINE DIPSTICK): NEGATIVE MG/DL
LEUKOCYTES: NEGATIVE
LYMPHOCYTES # BLD AUTO: 2.26 X10(3) UL (ref 0.9–4)
LYMPHOCYTES NFR BLD AUTO: 28.5 %
MCH RBC QN AUTO: 28.7 PG (ref 27–33.2)
MCHC RBC AUTO-ENTMCNC: 29.9 G/DL (ref 31–37)
MCV RBC AUTO: 95.9 FL (ref 81–100)
MONOCYTES # BLD AUTO: 0.8 X10(3) UL (ref 0.1–1)
MONOCYTES NFR BLD AUTO: 10.1 %
MULTISTIX LOT#: NORMAL NUMERIC
NEUTROPHIL ABS PRELIM: 4.7 X10 (3) UL (ref 1.3–6.7)
NEUTROPHILS # BLD AUTO: 4.7 X10(3) UL (ref 1.3–6.7)
NEUTROPHILS NFR BLD AUTO: 59.3 %
NITRITE, URINE: NEGATIVE
OCCULT BLOOD: NEGATIVE
PH, URINE: 7.5 (ref 4.5–8)
PLATELET # BLD AUTO: 269 10(3)UL (ref 150–450)
RBC # BLD AUTO: 4.92 X10(6)UL (ref 3.8–5.1)
RED CELL DISTRIBUTION WIDTH-SD: 48.7 FL (ref 35.1–46.3)
SPECIFIC GRAVITY: 1.02 (ref 1–1.03)
URINE-COLOR: YELLOW
UROBILINOGEN,SEMI-QN: 0.2 MG/DL (ref 0–1.9)
WBC # BLD AUTO: 7.9 X10(3) UL (ref 4–13)

## 2018-09-17 PROCEDURE — 36415 COLL VENOUS BLD VENIPUNCTURE: CPT | Performed by: FAMILY MEDICINE

## 2018-09-17 PROCEDURE — 99214 OFFICE O/P EST MOD 30 MIN: CPT | Performed by: FAMILY MEDICINE

## 2018-09-17 PROCEDURE — 80053 COMPREHEN METABOLIC PANEL: CPT | Performed by: FAMILY MEDICINE

## 2018-09-17 PROCEDURE — 82607 VITAMIN B-12: CPT | Performed by: FAMILY MEDICINE

## 2018-09-17 PROCEDURE — 81003 URINALYSIS AUTO W/O SCOPE: CPT | Performed by: FAMILY MEDICINE

## 2018-09-17 PROCEDURE — 85025 COMPLETE CBC W/AUTO DIFF WBC: CPT | Performed by: FAMILY MEDICINE

## 2018-09-17 RX ORDER — MELATONIN
1000 DAILY
COMMUNITY
End: 2019-03-04

## 2018-09-17 NOTE — PROGRESS NOTES
[facility] [place]  PROGRESS NOTE  Chief Complaint:   Patient presents with:  Nausea: on and off for the past couple of weeks  Dizziness      HPI:   This is a 45year old female presents c/o nausea and dizziness that has been present for past co Rfl: 1   MIRTAZAPINE 30 MG Oral Tab TAKE 1 TABLET BY MOUTH EVERY NIGHT AT BEDTIME Disp: 90 tablet Rfl: 0   TRAMADOL HCL 50 MG Oral Tab TAKE 1 TABLET BY MOUTH EVERY 4 HOURS AS NEEDED Disp: 30 tablet Rfl: 1   Fluticasone Propionate 50 MCG/ACT Nasal Suspensio tablet Rfl: 3      Counseling given: Not Answered         REVIEW OF SYSTEMS:   CONSTITUTIONAL:  Denies unusual weight gain/loss, fever, chills, see HPI  EYES:  Denies eye pain, visual loss, blurred vision, double vision or yellow sclerae.    HEENT:  Denies bilterally, no rales/rhonchi/wheezing. HEART:  Regular rate and rhythm, S1 and S2 are normal, no murmurs, rubs or gallops. EXTREMITIES: No edema, no cyanosis, no clubbing, FROM, 2+ dorsalis pedis pulses bilaterally.   ABDOMEN: Soft, nondistended, nontende Colorectal Screening due on 08/16/2017  Influenza Vaccine(1) due on 09/01/2018    Patient/Caregiver Education: Patient/Caregiver Education: There are no barriers to learning. Medical education done. Outcome: Patient verbalizes understanding.  Patient is n

## 2018-09-17 NOTE — PATIENT INSTRUCTIONS
Recommend plenty of fluids. Stop b12 supplement. Have 3 smaller meal.   Check labs today. Monitor blood pressure. Return to clinic if systolic blood pressure remains more than 150. Return to clinic in 1 weeks if no improvement.  Sooner if symptoms ge

## 2018-09-18 LAB
ALBUMIN SERPL-MCNC: 3.9 G/DL (ref 3.5–4.8)
ALBUMIN/GLOB SERPL: 1.1 {RATIO} (ref 1–2)
ALP LIVER SERPL-CCNC: 71 U/L (ref 41–108)
ALT SERPL-CCNC: 40 U/L (ref 14–54)
ANION GAP SERPL CALC-SCNC: 8 MMOL/L (ref 0–18)
AST SERPL-CCNC: 23 U/L (ref 15–41)
BILIRUB SERPL-MCNC: 0.3 MG/DL (ref 0.1–2)
BUN BLD-MCNC: 13 MG/DL (ref 8–20)
BUN/CREAT SERPL: 16 (ref 10–20)
CALCIUM BLD-MCNC: 9.1 MG/DL (ref 8.3–10.3)
CHLORIDE SERPL-SCNC: 108 MMOL/L (ref 101–111)
CO2 SERPL-SCNC: 26 MMOL/L (ref 22–32)
CREAT BLD-MCNC: 0.81 MG/DL (ref 0.55–1.02)
GLOBULIN PLAS-MCNC: 3.4 G/DL (ref 2.5–4)
GLUCOSE BLD-MCNC: 100 MG/DL (ref 70–99)
HAV AB SERPL IA-ACNC: 455 PG/ML (ref 193–986)
M PROTEIN MFR SERPL ELPH: 7.3 G/DL (ref 6.1–8.3)
OSMOLALITY SERPL CALC.SUM OF ELEC: 294 MOSM/KG (ref 275–295)
POTASSIUM SERPL-SCNC: 4.1 MMOL/L (ref 3.6–5.1)
SODIUM SERPL-SCNC: 142 MMOL/L (ref 136–144)

## 2018-09-19 ENCOUNTER — TELEPHONE (OUTPATIENT)
Dept: FAMILY MEDICINE CLINIC | Facility: CLINIC | Age: 53
End: 2018-09-19

## 2018-09-19 NOTE — TELEPHONE ENCOUNTER
----- Message from Dimitris Rdz MD sent at 9/19/2018  8:45 AM CDT -----  Please inform patient that CBC, CMP, vitamin B12 and urinalysis is all within normal range. Recommend to return to clinic for symptoms does not improve.

## 2018-10-02 ENCOUNTER — TELEPHONE (OUTPATIENT)
Dept: FAMILY MEDICINE CLINIC | Facility: CLINIC | Age: 53
End: 2018-10-02

## 2018-10-31 RX ORDER — MIRTAZAPINE 30 MG/1
TABLET, FILM COATED ORAL
Qty: 90 TABLET | Refills: 1 | Status: SHIPPED | OUTPATIENT
Start: 2018-10-31 | End: 2019-02-17

## 2018-10-31 NOTE — TELEPHONE ENCOUNTER
Future appt:    Last Appointment:  6/9/2018; No f/u recommended    Cholesterol, Total (mg/dL)   Date Value   06/09/2018 163     HDL Cholesterol (mg/dL)   Date Value   06/09/2018 63     LDL Cholesterol (mg/dL)   Date Value   06/09/2018 81     Triglycerides

## 2018-11-06 RX ORDER — MOMETASONE FUROATE 220 UG/1
INHALANT RESPIRATORY (INHALATION)
Qty: 3 INHALER | Refills: 1 | Status: SHIPPED | OUTPATIENT
Start: 2018-11-06 | End: 2019-05-07

## 2018-11-06 NOTE — TELEPHONE ENCOUNTER
Future appt:    Last Appointment:  6/9/2018  Cholesterol, Total (mg/dL)   Date Value   06/09/2018 163     HDL Cholesterol (mg/dL)   Date Value   06/09/2018 63     LDL Cholesterol (mg/dL)   Date Value   06/09/2018 81     Triglycerides (mg/dL)   Date Value

## 2018-11-14 RX ORDER — MONTELUKAST SODIUM 10 MG/1
TABLET ORAL
Qty: 90 TABLET | Refills: 3 | Status: SHIPPED | OUTPATIENT
Start: 2018-11-14 | End: 2020-01-24

## 2018-12-12 RX ORDER — TRAMADOL HYDROCHLORIDE 50 MG/1
50 TABLET ORAL EVERY 4 HOURS PRN
Qty: 30 TABLET | Refills: 0 | Status: SHIPPED
Start: 2018-12-12 | End: 2020-01-30

## 2018-12-28 RX ORDER — ESTRADIOL 1 MG/1
TABLET ORAL
Qty: 90 TABLET | Refills: 1 | Status: SHIPPED | OUTPATIENT
Start: 2018-12-28 | End: 2019-07-03

## 2019-01-05 RX ORDER — ESOMEPRAZOLE MAGNESIUM 40 MG/1
CAPSULE, DELAYED RELEASE ORAL
Qty: 90 CAPSULE | Refills: 0 | Status: SHIPPED | OUTPATIENT
Start: 2019-01-05 | End: 2019-04-04

## 2019-01-28 RX ORDER — ATORVASTATIN CALCIUM 20 MG/1
TABLET, FILM COATED ORAL
Qty: 90 TABLET | Refills: 1 | Status: SHIPPED | OUTPATIENT
Start: 2019-01-28 | End: 2019-03-04

## 2019-01-28 RX ORDER — BUPROPION HYDROCHLORIDE 150 MG/1
TABLET ORAL
Qty: 180 TABLET | Refills: 1 | Status: SHIPPED | OUTPATIENT
Start: 2019-01-28 | End: 2019-02-16

## 2019-01-28 RX ORDER — ATOMOXETINE 60 MG/1
CAPSULE ORAL
Qty: 90 CAPSULE | Refills: 1 | Status: SHIPPED | OUTPATIENT
Start: 2019-01-28 | End: 2019-03-04

## 2019-02-16 ENCOUNTER — OFFICE VISIT (OUTPATIENT)
Dept: FAMILY MEDICINE CLINIC | Facility: CLINIC | Age: 54
End: 2019-02-16
Payer: COMMERCIAL

## 2019-02-16 ENCOUNTER — TELEPHONE (OUTPATIENT)
Dept: FAMILY MEDICINE CLINIC | Facility: CLINIC | Age: 54
End: 2019-02-16

## 2019-02-16 VITALS
HEART RATE: 126 BPM | BODY MASS INDEX: 49.89 KG/M2 | HEIGHT: 64 IN | OXYGEN SATURATION: 98 % | DIASTOLIC BLOOD PRESSURE: 96 MMHG | WEIGHT: 292.25 LBS | SYSTOLIC BLOOD PRESSURE: 120 MMHG | RESPIRATION RATE: 18 BRPM | TEMPERATURE: 99 F

## 2019-02-16 DIAGNOSIS — M05.79 RHEUMATOID ARTHRITIS INVOLVING MULTIPLE SITES WITH POSITIVE RHEUMATOID FACTOR (HCC): ICD-10-CM

## 2019-02-16 DIAGNOSIS — M15.9 PRIMARY OSTEOARTHRITIS INVOLVING MULTIPLE JOINTS: ICD-10-CM

## 2019-02-16 DIAGNOSIS — J45.40 MODERATE PERSISTENT ASTHMA WITHOUT COMPLICATION: ICD-10-CM

## 2019-02-16 DIAGNOSIS — F33.1 MODERATE EPISODE OF RECURRENT MAJOR DEPRESSIVE DISORDER (HCC): ICD-10-CM

## 2019-02-16 DIAGNOSIS — F41.1 ANXIETY STATE: ICD-10-CM

## 2019-02-16 DIAGNOSIS — A41.9 SEPSIS, DUE TO UNSPECIFIED ORGANISM: Primary | ICD-10-CM

## 2019-02-16 PROCEDURE — 1111F DSCHRG MED/CURRENT MED MERGE: CPT | Performed by: FAMILY MEDICINE

## 2019-02-16 PROCEDURE — 99214 OFFICE O/P EST MOD 30 MIN: CPT | Performed by: FAMILY MEDICINE

## 2019-02-16 RX ORDER — FUROSEMIDE 20 MG/1
TABLET ORAL
Qty: 90 TABLET | Refills: 1 | OUTPATIENT
Start: 2019-02-16

## 2019-02-16 RX ORDER — FUROSEMIDE 20 MG/1
20 TABLET ORAL DAILY
Qty: 15 TABLET | Refills: 1 | Status: SHIPPED | OUTPATIENT
Start: 2019-02-16 | End: 2019-03-04

## 2019-02-16 RX ORDER — CLONAZEPAM 1 MG/1
1 TABLET ORAL 3 TIMES DAILY PRN
Qty: 30 TABLET | Refills: 0 | Status: SHIPPED | OUTPATIENT
Start: 2019-02-16 | End: 2021-07-30

## 2019-02-16 NOTE — TELEPHONE ENCOUNTER
Beau states Patient with Allergy to Sulfa. Questions Furosemide. Please advise.   Tamiko Fink, 02/16/19, 11:15 AM

## 2019-02-16 NOTE — PROGRESS NOTES
[facility] [place]  PROGRESS NOTE  Chief Complaint:   Patient presents with:  Hospital F/U      HPI:   This is a 52year old female coming in for hospital follow-up. She was hospitalized at [facility] from February 3 - February 7.   She Negative mg/dL    Spec Gravity 1.020 1.005 - 1.030    Blood Urine Negative Negative    PH Urine 7.5 4.5 - 8.0    Protein Urine Trace Negative/Trace mg/dL    Urobilinogen Urine 0.2 0.0 - 1.9 mg/dL    Nitrite Urine Negative Negative    Leukocyte Esterase Netta Bowman Neutrophil % 59.3 %    Lymphocyte % 28.5 %    Monocyte % 10.1 %    Eosinophil % 0.9 %    Basophil % 0.8 %    Immature Granulocyte % 0.4 %       Past Medical History:   Diagnosis Date   • Allergic rhinitis    • Anxiety    • Arthritis    • Asthma    • Depres ASMANEX 60 METERED DOSES 220 MCG/INH Inhalation Aerosol Powder, Breath Activated INHALE 1 PUFF BY MOUTH TWICE DAILY Disp: 3 Inhaler Rfl: 1   MIRTAZAPINE 30 MG Oral Tab TAKE 1 TABLET BY MOUTH EVERY NIGHT AT BEDTIME Disp: 90 tablet Rfl: 1   Cholecalciferol fatigued and has trouble getting around. EENT:  Eyes:  Denies eye pain, visual loss, blurred vision, double vision or yellow sclerae. Ears, Nose, Throat:  Denies hearing loss, sneezing, congestion, runny nose or sore throat.   INTEGUMENTARY:  Denies rashes lesions or ulcerations, good dentition. NECK: Supple, no CLAD, no JVD, no thyromegaly. SKIN: No rashes, no skin lesion, no bruising, good turgor. HEART:  Regular rate and rhythm, no murmurs, rubs or gallops.   LUNGS: Clear to auscultation bilterally, no will not get refills on the clonazepam.  If in the future she needs medication for anxiety we will consider adding a non-benzodiazepine long-acting agent.       Meds & Refills for this Visit:  Requested Prescriptions     Signed Prescriptions Disp Refills PM

## 2019-02-18 RX ORDER — MIRTAZAPINE 30 MG/1
TABLET, FILM COATED ORAL
Qty: 90 TABLET | Refills: 1 | Status: SHIPPED | OUTPATIENT
Start: 2019-02-18 | End: 2020-04-29

## 2019-02-18 NOTE — TELEPHONE ENCOUNTER
Future appt:     Your appointments     Date & Time Appointment Department [facility])    Mar 04, 2019  3:30 PM CST Follow up with Hansel Leventhal, MD [address]

## 2019-02-26 ENCOUNTER — MED REC SCAN ONLY (OUTPATIENT)
Dept: FAMILY MEDICINE CLINIC | Facility: CLINIC | Age: 54
End: 2019-02-26

## 2019-02-27 ENCOUNTER — TELEPHONE (OUTPATIENT)
Dept: FAMILY MEDICINE CLINIC | Facility: CLINIC | Age: 54
End: 2019-02-27

## 2019-02-27 NOTE — TELEPHONE ENCOUNTER
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 Please enclose copies of our most recent progress note as well as copies of the most recent lab testing that she has done here.

## 2019-03-04 ENCOUNTER — OFFICE VISIT (OUTPATIENT)
Dept: FAMILY MEDICINE CLINIC | Facility: CLINIC | Age: 54
End: 2019-03-04
Payer: COMMERCIAL

## 2019-03-04 VITALS
HEART RATE: 108 BPM | DIASTOLIC BLOOD PRESSURE: 84 MMHG | SYSTOLIC BLOOD PRESSURE: 140 MMHG | HEIGHT: 63 IN | BODY MASS INDEX: 51.91 KG/M2 | WEIGHT: 293 LBS | RESPIRATION RATE: 20 BRPM | TEMPERATURE: 99 F

## 2019-03-04 DIAGNOSIS — M05.79 RHEUMATOID ARTHRITIS INVOLVING MULTIPLE SITES WITH POSITIVE RHEUMATOID FACTOR (HCC): Primary | ICD-10-CM

## 2019-03-04 DIAGNOSIS — R60.9 FLUID RETENTION: ICD-10-CM

## 2019-03-04 PROCEDURE — 99214 OFFICE O/P EST MOD 30 MIN: CPT | Performed by: FAMILY MEDICINE

## 2019-03-04 RX ORDER — POTASSIUM CHLORIDE 750 MG/1
10 TABLET, FILM COATED, EXTENDED RELEASE ORAL DAILY
Qty: 30 TABLET | Refills: 3 | Status: SHIPPED | OUTPATIENT
Start: 2019-03-04 | End: 2019-06-23

## 2019-03-04 RX ORDER — FUROSEMIDE 40 MG/1
TABLET ORAL
Qty: 385 TABLET | Refills: 3 | OUTPATIENT
Start: 2019-03-04

## 2019-03-04 RX ORDER — FUROSEMIDE 40 MG/1
80 TABLET ORAL 2 TIMES DAILY
Qty: 30 TABLET | Refills: 3 | Status: SHIPPED | OUTPATIENT
Start: 2019-03-04 | End: 2019-03-19

## 2019-03-04 NOTE — PROGRESS NOTES
[address]  PROGRESS NOTE  Chief Complaint:   Patient presents with: Follow - Up      HPI:   This is a 52year old female coming in for follow-up on her hospitalization. She said that unfortunately she does not feel any better.   She sa 14 - 54 U/L    Alkaline Phosphatase 71 41 - 108 U/L    Bilirubin, Total 0.3 0.1 - 2.0 mg/dL    Total Protein 7.3 6.1 - 8.3 g/dL    Albumin 3.9 3.5 - 4.8 g/dL    Globulin  3.4 2.5 - 4.0 g/dL    A/G Ratio 1.1 1.0 - 2.0   VITAMIN B12   Result Value Ref Range history.   Allergies:    Nitrofurantoin            Norfloxacin               Penicillin G              Phenobarbital             Sulfa Antibiotics         Levofloxacin In D5w     RASH    Comment:Developed rash & phlebitis  Current Meds:    Current Outpatien puffs into the lungs every 4 (four) hours as needed. Disp: 3 Inhaler Rfl: 3   Certolizumab Pegol (CIMZIA) 2 X 200 MG Subcutaneous Kit  Disp:  Rfl:    ondansetron 8 MG Oral Tablet Dispersible Take 8 mg by mouth 3 (three) times daily as needed.  Disp:  Rfl: 0 Denies depression or anxiety. ENDOCRINOLOGIC:  Denies excessive sweating, cold or heat intolerance, polyuria or polydipsia. ALLERGIES:  Denies allergic response, history of asthma, sneezing, hives, eczema or rhinitis.      EXAM:   /84 (BP Location: this time. 2. Fluid retention  She has a lot of fluid retention. Some of it may be due to IV fluid in the hospital but some of it may also be due to the overall inflammatory effect of the rheumatoid arthritis.   Plan: Lasix 80 mg daily for the next 2 we Sciatica     Sleep apnea     S/P CHRISTINA-BSO (total abdominal hysterectomy and bilateral salpingo-oophorectomy)     Vitamin D deficiency     Benign paroxysmal positional vertigo     Rheumatoid arthritis ([facility] [address]     Sepsis (HCC)     Fluid retention      SALLY T

## 2019-03-04 NOTE — TELEPHONE ENCOUNTER
The prescription was written for a short duration only. She will not be taking this high dose of Lasix for a long time. I do not want to make this a 90-day prescription.

## 2019-03-15 ENCOUNTER — TELEPHONE (OUTPATIENT)
Dept: FAMILY MEDICINE CLINIC | Facility: CLINIC | Age: 54
End: 2019-03-15

## 2019-03-15 NOTE — TELEPHONE ENCOUNTER
We received a request for medical records from [place] [employer]. They requsted a copy of pt's chart from 11/1/17 to present date. This was sent to [employer].

## 2019-03-19 ENCOUNTER — OFFICE VISIT (OUTPATIENT)
Dept: FAMILY MEDICINE CLINIC | Facility: CLINIC | Age: 54
End: 2019-03-19
Payer: COMMERCIAL

## 2019-03-19 VITALS
BODY MASS INDEX: 51.91 KG/M2 | OXYGEN SATURATION: 97 % | RESPIRATION RATE: 20 BRPM | HEART RATE: 120 BPM | SYSTOLIC BLOOD PRESSURE: 132 MMHG | WEIGHT: 293 LBS | HEIGHT: 63 IN | DIASTOLIC BLOOD PRESSURE: 88 MMHG | TEMPERATURE: 99 F

## 2019-03-19 DIAGNOSIS — R60.9 FLUID RETENTION: Primary | ICD-10-CM

## 2019-03-19 DIAGNOSIS — M05.79 RHEUMATOID ARTHRITIS INVOLVING MULTIPLE SITES WITH POSITIVE RHEUMATOID FACTOR (HCC): ICD-10-CM

## 2019-03-19 PROCEDURE — 99213 OFFICE O/P EST LOW 20 MIN: CPT | Performed by: FAMILY MEDICINE

## 2019-03-19 RX ORDER — PREDNISONE 10 MG/1
TABLET ORAL
Qty: 30 TABLET | Refills: 0 | Status: SHIPPED | OUTPATIENT
Start: 2019-03-19 | End: 2019-04-02 | Stop reason: ALTCHOICE

## 2019-03-19 RX ORDER — FUROSEMIDE 40 MG/1
80 TABLET ORAL DAILY
Qty: 60 TABLET | Refills: 1 | Status: SHIPPED | OUTPATIENT
Start: 2019-03-19 | End: 2019-03-19

## 2019-03-19 NOTE — PROGRESS NOTES
[address]  PROGRESS NOTE  Chief Complaint:   Patient presents with: Follow - Up  Cough: Productive in AM      HPI:   This is a 52year old female coming in for follow-up on her fluid retention.   She said she has been taking 80 mg of th WBC 7.9 4.0 - 13.0 x10(3) uL    RBC 4.92 3.80 - 5.10 x10(6)uL    HGB 14.1 12.0 - 16.0 g/dL    HCT 47.2 34.0 - 50.0 %    .0 150.0 - 450.0 10(3)uL    MCV 95.9 81.0 - 100.0 fL    MCH 28.7 27.0 - 33.2 pg    MCHC 29.9 (L) 31.0 - 37.0 g/dL    RDW 13.7 11. predniSONE 10 MG Oral Tab 40 mg for 3 days, 30 mg for 3 days, 20 mg for 3 days, 10 mg for 3 days, then stop. Disp: 30 tablet Rfl: 0   Potassium Chloride ER 10 MEQ Oral Tab CR Take 1 tablet (10 mEq total) by mouth daily.  Disp: 30 tablet Rfl: 3   MIRTAZAPI Disp:  Rfl: 0   methotrexate 2.5 MG Oral Tab Take 15 mg by mouth once a week. Disp:  Rfl: 3   Meloxicam 15 MG Oral Tab Take 15 mg by mouth daily. Disp:  Rfl: 2   folic acid 1 MG Oral Tab Take 1 mg by mouth daily.  Disp:  Rfl: 11   albuterol sulfate (2.5 MG/ Position: Sitting, Cuff Size: large)   Pulse 120   Temp 99.3 °F (37.4 °C) (Tympanic)   Resp 20   Ht 63\"   Wt 295 lb   SpO2 97%   BMI 52.26 kg/m²  Estimated body mass index is 52.26 kg/m² as calculated from the following:    Height as of this encounter: 61 total) by mouth daily. • predniSONE 10 MG Oral Tab 30 tablet 0     Si mg for 3 days, 30 mg for 3 days, 20 mg for 3 days, 10 mg for 3 days, then stop.        Health Maintenance:  Asthma Action Plan due on 10/14/1963  Asthma Control Test due on 10/14/1

## 2019-03-20 RX ORDER — FUROSEMIDE 40 MG/1
TABLET ORAL
Qty: 180 TABLET | Refills: 1 | Status: SHIPPED | OUTPATIENT
Start: 2019-03-20 | End: 2019-04-02

## 2019-04-02 ENCOUNTER — OFFICE VISIT (OUTPATIENT)
Dept: FAMILY MEDICINE CLINIC | Facility: CLINIC | Age: 54
End: 2019-04-02
Payer: COMMERCIAL

## 2019-04-02 VITALS
RESPIRATION RATE: 20 BRPM | HEIGHT: 64 IN | BODY MASS INDEX: 50.02 KG/M2 | WEIGHT: 293 LBS | DIASTOLIC BLOOD PRESSURE: 88 MMHG | SYSTOLIC BLOOD PRESSURE: 128 MMHG | OXYGEN SATURATION: 98 % | HEART RATE: 115 BPM | TEMPERATURE: 99 F

## 2019-04-02 DIAGNOSIS — J45.40 MODERATE PERSISTENT ASTHMA WITHOUT COMPLICATION: ICD-10-CM

## 2019-04-02 DIAGNOSIS — R60.9 FLUID RETENTION: Primary | ICD-10-CM

## 2019-04-02 DIAGNOSIS — M05.79 RHEUMATOID ARTHRITIS INVOLVING MULTIPLE SITES WITH POSITIVE RHEUMATOID FACTOR (HCC): ICD-10-CM

## 2019-04-02 PROCEDURE — 99213 OFFICE O/P EST LOW 20 MIN: CPT | Performed by: FAMILY MEDICINE

## 2019-04-02 RX ORDER — FUROSEMIDE 40 MG/1
40 TABLET ORAL DAILY
COMMUNITY
Start: 2019-04-02 | End: 2019-05-07

## 2019-04-02 NOTE — PROGRESS NOTES
[address]  PROGRESS NOTE  Chief Complaint:   Patient presents with: Follow - Up      HPI:   This is a 52year old female coming in for follow-up on her shortness of breath.   She said that she has been taking the furosemide 80 mg every 2.0 mg/dL    Total Protein 7.3 6.1 - 8.3 g/dL    Albumin 3.9 3.5 - 4.8 g/dL    Globulin  3.4 2.5 - 4.0 g/dL    A/G Ratio 1.1 1.0 - 2.0   VITAMIN B12   Result Value Ref Range    Vitamin B12 455 193 - 986 pg/mL   CBC W/ DIFFERENTIAL   Result Value Ref Range Phenobarbital             Sulfa Antibiotics         Levofloxacin In D5w     RASH    Comment:Developed rash & phlebitis  Current Meds:    Current Outpatient Medications:  Calcium Citrate-Vitamin D (CITRACAL/VITAMIN D OR) Take 1 tablet by mouth daily.  Disp (four) hours as needed. Disp: 3 Inhaler Rfl: 3   Certolizumab Pegol (CIMZIA) 2 X 200 MG Subcutaneous Kit  Disp:  Rfl:    ondansetron 8 MG Oral Tablet Dispersible Take 8 mg by mouth 3 (three) times daily as needed.  Disp:  Rfl: 0   methotrexate 2.5 MG Oral T intolerance, polyuria or polydipsia. ALLERGIES:  Denies allergic response, history of asthma, sneezing, hives, eczema or rhinitis.      EXAM:   /88 (BP Location: Right arm, Patient Position: Sitting, Cuff Size: large)   Pulse 115   Temp 99.4 °F (37.4 encounter         Patient/Caregiver Education: Patient/Caregiver Education: There are no barriers to learning. Medical education done. Outcome: Patient verbalizes understanding.  Patient is notified to call with any questions, complications, allergies, or

## 2019-04-04 NOTE — TELEPHONE ENCOUNTER
Future Appointments   Date Time Provider Marlene Bronson   5/7/2019  3:30 PM Suman Calderon MD [facility] [place] [facility] [place]     Future appt:     Your appointments     Date & Time Appointment Department [facility])    May 07, 2019  3:30 PM CDT Follow up - Ex

## 2019-04-05 RX ORDER — ESOMEPRAZOLE MAGNESIUM 40 MG/1
CAPSULE, DELAYED RELEASE ORAL
Qty: 90 CAPSULE | Refills: 1 | Status: SHIPPED | OUTPATIENT
Start: 2019-04-05 | End: 2019-10-02

## 2019-04-09 ENCOUNTER — TELEPHONE (OUTPATIENT)
Dept: FAMILY MEDICINE CLINIC | Facility: CLINIC | Age: 54
End: 2019-04-09

## 2019-04-10 NOTE — TELEPHONE ENCOUNTER
Medical Records request was recieved from [place] [employer] [facility]) [address] [facility] [address] [place] [address] M fax [phone] requesting 11/1/2017-present.     Request sent to [employer] as Urgent because th

## 2019-05-07 ENCOUNTER — OFFICE VISIT (OUTPATIENT)
Dept: FAMILY MEDICINE CLINIC | Facility: CLINIC | Age: 54
End: 2019-05-07
Payer: COMMERCIAL

## 2019-05-07 VITALS
WEIGHT: 293 LBS | TEMPERATURE: 99 F | BODY MASS INDEX: 50.02 KG/M2 | HEART RATE: 118 BPM | SYSTOLIC BLOOD PRESSURE: 134 MMHG | RESPIRATION RATE: 20 BRPM | DIASTOLIC BLOOD PRESSURE: 86 MMHG | HEIGHT: 64 IN

## 2019-05-07 DIAGNOSIS — Z12.39 SCREENING FOR BREAST CANCER: ICD-10-CM

## 2019-05-07 DIAGNOSIS — Z12.11 SCREENING FOR COLON CANCER: ICD-10-CM

## 2019-05-07 DIAGNOSIS — R60.9 FLUID RETENTION: Primary | ICD-10-CM

## 2019-05-07 DIAGNOSIS — J45.40 MODERATE PERSISTENT ASTHMA WITHOUT COMPLICATION: ICD-10-CM

## 2019-05-07 DIAGNOSIS — M05.79 RHEUMATOID ARTHRITIS INVOLVING MULTIPLE SITES WITH POSITIVE RHEUMATOID FACTOR (HCC): ICD-10-CM

## 2019-05-07 PROCEDURE — 90471 IMMUNIZATION ADMIN: CPT | Performed by: FAMILY MEDICINE

## 2019-05-07 PROCEDURE — 90732 PPSV23 VACC 2 YRS+ SUBQ/IM: CPT | Performed by: FAMILY MEDICINE

## 2019-05-07 PROCEDURE — 99214 OFFICE O/P EST MOD 30 MIN: CPT | Performed by: FAMILY MEDICINE

## 2019-05-07 RX ORDER — FLUTICASONE PROPIONATE 50 MCG
2 SPRAY, SUSPENSION (ML) NASAL DAILY
Qty: 3 BOTTLE | Refills: 3 | Status: SHIPPED | OUTPATIENT
Start: 2019-05-07

## 2019-05-07 RX ORDER — ALBUTEROL SULFATE 90 UG/1
2 AEROSOL, METERED RESPIRATORY (INHALATION) EVERY 4 HOURS PRN
Qty: 3 INHALER | Refills: 3 | Status: SHIPPED | OUTPATIENT
Start: 2019-05-07 | End: 2021-02-05

## 2019-05-07 RX ORDER — FUROSEMIDE 40 MG/1
40 TABLET ORAL 2 TIMES DAILY
Qty: 180 TABLET | Refills: 1 | Status: SHIPPED | OUTPATIENT
Start: 2019-05-07 | End: 2019-07-10

## 2019-05-07 NOTE — PROGRESS NOTES
[address]  PROGRESS NOTE  Chief Complaint:   Patient presents with: Follow - Up      HPI:   This is a 52year old female coming in for follow-up on her medication. She said that unfortunately she continues to gain weight.   She feels l x10(3) uL    RBC 4.92 3.80 - 5.10 x10(6)uL    HGB 14.1 12.0 - 16.0 g/dL    HCT 47.2 34.0 - 50.0 %    .0 150.0 - 450.0 10(3)uL    MCV 95.9 81.0 - 100.0 fL    MCH 28.7 27.0 - 33.2 pg    MCHC 29.9 (L) 31.0 - 37.0 g/dL    RDW 13.7 11.5 - 16.0 %    RDW-S Disp: 3 Inhaler Rfl: 1   Fluticasone Propionate 50 MCG/ACT Nasal Suspension 2 sprays by Nasal route daily.  Disp: 3 Bottle Rfl: 3   Albuterol Sulfate HFA (PROAIR HFA) 108 (90 Base) MCG/ACT Inhalation Aero Soln Inhale 2 puffs into the lungs every 4 (four) ho mg by mouth once a week. Disp:  Rfl: 3   Meloxicam 15 MG Oral Tab Take 15 mg by mouth daily. Disp:  Rfl: 2   folic acid 1 MG Oral Tab Take 1 mg by mouth daily.  Disp:  Rfl: 11   albuterol sulfate (2.5 MG/3ML) 0.083% Inhalation Nebu Soln Take 2.5 mg by nebul Resp 20   Ht 64\"   Wt (!) 306 lb   BMI 52.52 kg/m²  Estimated body mass index is 52.52 kg/m² as calculated from the following:    Height as of this encounter: 64\". Weight as of this encounter: 306 lb. Vital signs reviewed.   Physical Exam:  GEN: Sh FIT testing. A fit stool card was given to her today. - OCCULT BLOOD, FECAL, IMMUNOASSAY; Future    Increase furosemide to 40 mg twice daily. Recheck in 3 to 4 weeks. Please watch the diet carefully.     Meds & Refills for this Visit:  Requested [employer] apnea     S/P CHRISTINA-BSO (total abdominal hysterectomy and bilateral salpingo-oophorectomy)     Vitamin D deficiency     Benign paroxysmal positional vertigo     Rheumatoid arthritis ([facility] [address]     Sepsis ([facility]     Fluid retention      Brody Severino MD  5/7/20

## 2019-05-07 NOTE — PATIENT INSTRUCTIONS
Increase furosemide to 40 mg twice a day. Pneumovax vaccine today. Recheck in 1 month. Continue with a very low sodium diet.

## 2019-05-12 ENCOUNTER — APPOINTMENT (OUTPATIENT)
Dept: LAB | Facility: HOSPITAL | Age: 54
End: 2019-05-12
Attending: FAMILY MEDICINE
Payer: COMMERCIAL

## 2019-05-12 DIAGNOSIS — Z12.11 SCREENING FOR COLON CANCER: ICD-10-CM

## 2019-05-17 PROCEDURE — 82274 ASSAY TEST FOR BLOOD FECAL: CPT

## 2019-05-20 ENCOUNTER — TELEPHONE (OUTPATIENT)
Dept: FAMILY MEDICINE CLINIC | Facility: CLINIC | Age: 54
End: 2019-05-20

## 2019-05-20 DIAGNOSIS — R19.5 HEME POSITIVE STOOL: Primary | ICD-10-CM

## 2019-05-20 RX ORDER — MIRTAZAPINE 30 MG/1
TABLET, FILM COATED ORAL
Qty: 90 TABLET | Refills: 1 | Status: SHIPPED | OUTPATIENT
Start: 2019-05-20 | End: 2019-06-10

## 2019-05-20 NOTE — TELEPHONE ENCOUNTER
----- Message from Nandini Tyson MD sent at 5/20/2019  1:18 PM CDT -----  Please let Opal know that unfortunately the stool testing was positive for blood. The next step is a colonoscopy. I will put in a referral for her.

## 2019-05-20 NOTE — TELEPHONE ENCOUNTER
Future appt:     Your appointments     Date & Time Appointment Department (Center)    Tray 10, 2019  3:00 PM CDT [facility] DIGITAL SCREENING [facility] [address] Mammography Department located in [facility] ([facility])    Do NOT use deodorant, [address]  [facility] [address]  [address] [facility] XRAY Department located in [address]  [address] [facility] [address], [address] [facility]

## 2019-05-20 NOTE — TELEPHONE ENCOUNTER
Unfortunately the stool testing is positive for blood. The next step is referral for a colonoscopy. I will enter that for her.

## 2019-05-21 NOTE — TELEPHONE ENCOUNTER
Patient informed of below. States she has been contacted by   [employer]. She will call them back and schedule.   Elvia Every, 05/21/19, 4:17 PM

## 2019-06-10 ENCOUNTER — OFFICE VISIT (OUTPATIENT)
Dept: FAMILY MEDICINE CLINIC | Facility: CLINIC | Age: 54
End: 2019-06-10
Payer: COMMERCIAL

## 2019-06-10 ENCOUNTER — HOSPITAL ENCOUNTER (OUTPATIENT)
Dept: MAMMOGRAPHY | Age: 54
Discharge: HOME | End: 2019-06-10
Attending: FAMILY MEDICINE
Payer: COMMERCIAL

## 2019-06-10 VITALS
DIASTOLIC BLOOD PRESSURE: 88 MMHG | RESPIRATION RATE: 18 BRPM | HEART RATE: 96 BPM | OXYGEN SATURATION: 97 % | BODY MASS INDEX: 50.02 KG/M2 | TEMPERATURE: 97 F | WEIGHT: 293 LBS | SYSTOLIC BLOOD PRESSURE: 136 MMHG | HEIGHT: 64 IN

## 2019-06-10 DIAGNOSIS — Z12.39 SCREENING FOR BREAST CANCER: ICD-10-CM

## 2019-06-10 DIAGNOSIS — M15.9 PRIMARY OSTEOARTHRITIS INVOLVING MULTIPLE JOINTS: ICD-10-CM

## 2019-06-10 DIAGNOSIS — I83.93 ASYMPTOMATIC VARICOSE VEINS OF BOTH LOWER EXTREMITIES: ICD-10-CM

## 2019-06-10 DIAGNOSIS — E78.49 FAMILIAL MULTIPLE LIPOPROTEIN-TYPE HYPERLIPIDEMIA: ICD-10-CM

## 2019-06-10 DIAGNOSIS — M05.79 RHEUMATOID ARTHRITIS INVOLVING MULTIPLE SITES WITH POSITIVE RHEUMATOID FACTOR (HCC): ICD-10-CM

## 2019-06-10 DIAGNOSIS — R60.9 FLUID RETENTION: Primary | ICD-10-CM

## 2019-06-10 PROCEDURE — 77063 BREAST TOMOSYNTHESIS BI: CPT | Performed by: FAMILY MEDICINE

## 2019-06-10 PROCEDURE — 99213 OFFICE O/P EST LOW 20 MIN: CPT | Performed by: FAMILY MEDICINE

## 2019-06-10 PROCEDURE — 77067 SCR MAMMO BI INCL CAD: CPT | Performed by: FAMILY MEDICINE

## 2019-06-10 RX ORDER — PREDNISONE 1 MG/1
1 TABLET ORAL DAILY
Refills: 2 | COMMUNITY
Start: 2019-05-31 | End: 2020-08-31

## 2019-06-10 NOTE — PROGRESS NOTES
[facility] [place]  PROGRESS NOTE  Chief Complaint:   Patient presents with:  Leg Swelling: F/U      HPI:   This is a 52year old female coming in for leg swelling and both legs. She said that she saw Dr. Frances Dooley  last week.   He gave her predn (ASMANEX 60 METERED DOSES) 220 MCG/INH Inhalation Aerosol Powder, Breath Activated INHALE 1 PUFF BY MOUTH TWICE DAILY Disp: 3 Inhaler Rfl: 1   Fluticasone Propionate 50 MCG/ACT Nasal Suspension 2 sprays by Nasal route daily.  Disp: 3 Bottle Rfl: 3   Albuter Tablet Dispersible Take 8 mg by mouth 3 (three) times daily as needed. Disp:  Rfl: 0   methotrexate 2.5 MG Oral Tab Take 15 mg by mouth once a week. Disp:  Rfl: 3   Meloxicam 15 MG Oral Tab Take 15 mg by mouth daily.  Disp:  Rfl: 2   folic acid 1 MG Oral Ta allergic response. She feels like she is doing pretty well now.     EXAM:   /88 (BP Location: Left arm, Patient Position: Sitting, Cuff Size: large)   Pulse 96   Temp 96.9 °F (36.1 °C) (Temporal)   Resp 18   Ht 64\"   Wt (!) 303 lb 12.8 oz   SpO2 97% Refills for this Visit:  Requested Prescriptions      No prescriptions requested or ordered in this encounter       Health Maintenance:  Asthma Action Plan due on 10/14/1963  Asthma Control Test due on 10/14/1963  Mammogram due on 10/14/2003  Colonoscopy d

## 2019-06-11 ENCOUNTER — TELEPHONE (OUTPATIENT)
Dept: FAMILY MEDICINE CLINIC | Facility: CLINIC | Age: 54
End: 2019-06-11

## 2019-06-11 DIAGNOSIS — R92.8 ABNORMAL MAMMOGRAM: Primary | ICD-10-CM

## 2019-06-11 NOTE — TELEPHONE ENCOUNTER
Please call [facility].  Radiologist needs to compare mammogram with previous films. We will call her back when completed.

## 2019-06-12 ENCOUNTER — TELEPHONE (OUTPATIENT)
Dept: FAMILY MEDICINE CLINIC | Facility: CLINIC | Age: 54
End: 2019-06-12

## 2019-06-12 NOTE — TELEPHONE ENCOUNTER
----- Message from Cristiane Cardona MD sent at 6/11/2019  5:10 PM CDT -----  Please call [facility] - no old films available - radiologist recommends ultrasound.

## 2019-06-14 ENCOUNTER — TELEPHONE (OUTPATIENT)
Dept: MAMMOGRAPHY | Facility: HOSPITAL | Age: 54
End: 2019-06-14

## 2019-06-14 ENCOUNTER — HOSPITAL ENCOUNTER (OUTPATIENT)
Dept: ULTRASOUND IMAGING | Age: 54
Discharge: HOME | End: 2019-06-14
Attending: FAMILY MEDICINE
Payer: COMMERCIAL

## 2019-06-14 ENCOUNTER — TELEPHONE (OUTPATIENT)
Dept: FAMILY MEDICINE CLINIC | Facility: CLINIC | Age: 54
End: 2019-06-14

## 2019-06-14 DIAGNOSIS — R92.8 ABNORMAL FINDING ON BREAST IMAGING: Primary | ICD-10-CM

## 2019-06-14 DIAGNOSIS — R92.2 INCONCLUSIVE MAMMOGRAM: ICD-10-CM

## 2019-06-14 DIAGNOSIS — R92.8 ABNORMAL MAMMOGRAM: Primary | ICD-10-CM

## 2019-06-14 PROCEDURE — 76641 ULTRASOUND BREAST COMPLETE: CPT | Performed by: FAMILY MEDICINE

## 2019-06-14 RX ORDER — FLUTICASONE PROPIONATE 50 MCG
SPRAY, SUSPENSION (ML) NASAL
Refills: 0 | OUTPATIENT
Start: 2019-06-14

## 2019-06-14 NOTE — TELEPHONE ENCOUNTER
Medically clear for biopsy. No additional testing is needed now.   Most recent CBC was 2/7/2019 done at [facility] and was normal.

## 2019-06-14 NOTE — IMAGING NOTE
This Breast Care RN spoke with Deng Gonsales re Ultrasound guided Right breast biopsy recommendation by Dr. Raffaele Soler . Procedure reviewed and all questions answered. Pt states she is \"immuncompromised from her Rheumatology medicine.  Is there anything I ne

## 2019-06-14 NOTE — TELEPHONE ENCOUNTER
LM with Dr. Bridget Estrada office regarding new recommendation for Opal to have a breast biopsy. Pt states she ,\"is immunocompromised. \" AND is \"wondering if there is anything to do\" prior to invasive procedure? Awaiting call back with plan.   Pt is on Met

## 2019-06-14 NOTE — TELEPHONE ENCOUNTER
Per -  Breast Biopsy advised due to Breast U/S results. Breast lump/Ductal features seen.   Charissa Leija, 06/14/19, 3:44 PM

## 2019-06-14 NOTE — TELEPHONE ENCOUNTER
Patient had Breast U/S today. Will be scheduled for biopsy. See message from [employer] [facility] RN.   JOSE LAYNEOWELL [facility], 06/14/19, 12:44 PM

## 2019-06-15 NOTE — TELEPHONE ENCOUNTER
Patient informed of the below results and recommendations. Patient was aware and will schedule an appt.

## 2019-06-17 ENCOUNTER — MED REC SCAN ONLY (OUTPATIENT)
Dept: FAMILY MEDICINE CLINIC | Facility: CLINIC | Age: 54
End: 2019-06-17

## 2019-06-17 ENCOUNTER — TELEPHONE (OUTPATIENT)
Dept: MAMMOGRAPHY | Facility: HOSPITAL | Age: 54
End: 2019-06-17

## 2019-06-17 NOTE — TELEPHONE ENCOUNTER
Received call from [address] office. Pt to [address] until she gets the results from breast biopsy on 6/27.

## 2019-06-19 RX ORDER — ATORVASTATIN CALCIUM 20 MG/1
TABLET, FILM COATED ORAL
Qty: 90 TABLET | Refills: 1 | Status: SHIPPED | OUTPATIENT
Start: 2019-06-19 | End: 2019-07-10

## 2019-06-19 RX ORDER — BUPROPION HYDROCHLORIDE 150 MG/1
TABLET ORAL
Qty: 180 TABLET | Refills: 1 | Status: SHIPPED | OUTPATIENT
Start: 2019-06-19 | End: 2019-07-10

## 2019-06-19 RX ORDER — ATOMOXETINE 60 MG/1
CAPSULE ORAL
Qty: 90 CAPSULE | Refills: 1 | Status: SHIPPED | OUTPATIENT
Start: 2019-06-19 | End: 2019-07-10

## 2019-06-19 NOTE — TELEPHONE ENCOUNTER
Future appt:     Your appointments     Date & Time Appointment Department [facility])    Jun 27, 2019 10:00 AM CDT US BREAST BIOPSY with PF US [facility] [place] [facility] in [facility])    Please present to outpatient registratio [address]  [facility] [place] [address]  [phone]        Last Appointment:  6/10/2019  Cholesterol, Total (mg/dL)   Date Value   06/09/2018 163     HDL Cholesterol (mg/dL)   Date Value   06/09/2018 63     LDL Cholesterol (mg/dL)   Date Value   06/09/2018 81     Tr

## 2019-06-24 RX ORDER — POTASSIUM CHLORIDE 750 MG/1
TABLET, FILM COATED, EXTENDED RELEASE ORAL
Qty: 90 TABLET | Refills: 1 | Status: SHIPPED | OUTPATIENT
Start: 2019-06-24 | End: 2019-07-10

## 2019-06-24 NOTE — TELEPHONE ENCOUNTER
Future appt:     Your appointments     Date & Time Appointment Department [facility])    Jun 27, 2019 10:00 AM CDT US BREAST BIOPSY with PF US RM1 [place] [facility] in [facility])    Please present to outpatient registratio [address]  [place] [address]  [phone]        Last Appointment:  6/10/2019  Cholesterol, Total (mg/dL)   Date Value   06/09/2018 163     HDL Cholesterol (mg/dL)   Date Value   06/09/2018 63     LDL Cholesterol (mg/dL)   Date Value   06/09/2018 81     Tr

## 2019-06-27 ENCOUNTER — HOSPITAL ENCOUNTER (OUTPATIENT)
Dept: MAMMOGRAPHY | Age: 54
Discharge: HOME | End: 2019-06-27
Attending: FAMILY MEDICINE
Payer: COMMERCIAL

## 2019-06-27 ENCOUNTER — HOSPITAL ENCOUNTER (OUTPATIENT)
Dept: ULTRASOUND IMAGING | Age: 54
Discharge: HOME | End: 2019-06-27
Attending: FAMILY MEDICINE
Payer: COMMERCIAL

## 2019-06-27 DIAGNOSIS — R92.8 ABNORMAL MAMMOGRAM: ICD-10-CM

## 2019-06-27 PROCEDURE — 19083 BX BREAST 1ST LESION US IMAG: CPT | Performed by: FAMILY MEDICINE

## 2019-06-27 PROCEDURE — 88305 TISSUE EXAM BY PATHOLOGIST: CPT | Performed by: FAMILY MEDICINE

## 2019-06-27 PROCEDURE — 77065 DX MAMMO INCL CAD UNI: CPT | Performed by: FAMILY MEDICINE

## 2019-06-27 NOTE — IMAGING NOTE
Pt arrived with . Procedure explained throughout and all questions answered. Consent and discharge paperwork signed by Hilario Cardenas. Right  breast positioned. Assisted Dr. Estella Weiner with US guided biopsy.  Pt tolerated well.  Pressure held on biopsy s

## 2019-07-01 ENCOUNTER — TELEPHONE (OUTPATIENT)
Dept: MAMMOGRAPHY | Facility: HOSPITAL | Age: 54
End: 2019-07-01

## 2019-07-01 NOTE — TELEPHONE ENCOUNTER
Verified pt's name and . Spoke with patient regarding negative, concordant breast biopsy results. Biopsy site is healing well. Hematoma management discussed.   Radiologist recommendation is to get a Diagnostic mammogram in 6 months to document stabilit

## 2019-07-02 ENCOUNTER — TELEPHONE (OUTPATIENT)
Dept: FAMILY MEDICINE CLINIC | Facility: CLINIC | Age: 54
End: 2019-07-02

## 2019-07-02 NOTE — TELEPHONE ENCOUNTER
----- Message from Brigitte Strickland MD sent at 7/1/2019  5:01 PM CDT -----  The news is great. The breast biopsy was completely negative. There is no sign of cancer.

## 2019-07-03 RX ORDER — ESTRADIOL 1 MG/1
TABLET ORAL
Qty: 90 TABLET | Refills: 1 | Status: SHIPPED | OUTPATIENT
Start: 2019-07-03 | End: 2019-12-30

## 2019-07-03 NOTE — TELEPHONE ENCOUNTER
Future appt:     Your appointments     Date & Time Appointment Department [facility])    Jul 10, 2019  4:00 PM CDT Follow up with Homer Metz MD [address] [facility], [facility]            [facility]

## 2019-07-10 ENCOUNTER — OFFICE VISIT (OUTPATIENT)
Dept: FAMILY MEDICINE CLINIC | Facility: CLINIC | Age: 54
End: 2019-07-10
Payer: COMMERCIAL

## 2019-07-10 VITALS
HEART RATE: 126 BPM | WEIGHT: 293 LBS | SYSTOLIC BLOOD PRESSURE: 124 MMHG | OXYGEN SATURATION: 96 % | HEIGHT: 64 IN | RESPIRATION RATE: 18 BRPM | TEMPERATURE: 99 F | DIASTOLIC BLOOD PRESSURE: 82 MMHG | BODY MASS INDEX: 50.02 KG/M2

## 2019-07-10 DIAGNOSIS — R60.9 FLUID RETENTION: Primary | ICD-10-CM

## 2019-07-10 DIAGNOSIS — M05.79 RHEUMATOID ARTHRITIS INVOLVING MULTIPLE SITES WITH POSITIVE RHEUMATOID FACTOR (HCC): ICD-10-CM

## 2019-07-10 DIAGNOSIS — J45.40 MODERATE PERSISTENT ASTHMA WITHOUT COMPLICATION: ICD-10-CM

## 2019-07-10 PROCEDURE — 99213 OFFICE O/P EST LOW 20 MIN: CPT | Performed by: FAMILY MEDICINE

## 2019-07-10 RX ORDER — FUROSEMIDE 40 MG/1
40 TABLET ORAL DAILY
COMMUNITY
Start: 2019-07-10 | End: 2020-05-18

## 2019-07-10 RX ORDER — SPIRONOLACTONE 25 MG/1
25 TABLET ORAL DAILY
Qty: 90 TABLET | Refills: 1 | Status: SHIPPED | OUTPATIENT
Start: 2019-07-10 | End: 2020-01-14

## 2019-07-10 NOTE — PROGRESS NOTES
[address]  PROGRESS NOTE  Chief Complaint:   Patient presents with: Follow - Up: fluid retention       HPI:   This is a 52year old female coming in for follow-up on her fluid retention. She currently is taking prednisone 5 mg daily. Description       Labeled with the patient's name and medical record number, ultrasound-guided biopsy, right breast at 6 o'clock, received in formalin:  Specimen consists of multiple yellow-tan to white cores of fibrofatty tissue ranging in length from 0.1 by mouth daily.  Disp:  Rfl: 2   Mometasone Furoate (ASMANEX 60 METERED DOSES) 220 MCG/INH Inhalation Aerosol Powder, Breath Activated INHALE 1 PUFF BY MOUTH TWICE DAILY Disp: 3 Inhaler Rfl: 1   Fluticasone Propionate 50 MCG/ACT Nasal Suspension 2 sprays by every 4 (four) hours as needed. Disp:  Rfl:    Carbonyl Iron (FEOSOL) 45 MG Oral Tab Take 45 mg by mouth 2 (two) times daily.    Disp:  Rfl:    Nebulizers (NEBULIZER COMPRESSOR) Does not apply Misc  Disp:  Rfl:    BuPROPion HCl ER, XL, 150 MG Oral Tablet body mass index is 52.35 kg/m² as calculated from the following:    Height as of this encounter: 64\". Weight as of this encounter: 305 lb. Vital signs reviewed. Physical Exam:  GEN: She walked in with a cane.   Her gait is very stiff and somewhat uns mouth daily.        Health Maintenance:  Asthma Action Plan due on 10/14/1963  Asthma Control Test due on 10/14/1963  Colonoscopy due on 10/14/2013  Annual Physical due on 06/09/2019    Patient/Caregiver Education: Patient/Caregiver Education: There are no

## 2019-08-05 ENCOUNTER — LABORATORY ENCOUNTER (OUTPATIENT)
Dept: LAB | Age: 54
End: 2019-08-05
Attending: FAMILY MEDICINE
Payer: COMMERCIAL

## 2019-08-05 DIAGNOSIS — R60.9 FLUID RETENTION: ICD-10-CM

## 2019-08-05 DIAGNOSIS — M05.79 RHEUMATOID ARTHRITIS INVOLVING MULTIPLE SITES WITH POSITIVE RHEUMATOID FACTOR (HCC): ICD-10-CM

## 2019-08-05 DIAGNOSIS — J45.40 MODERATE PERSISTENT ASTHMA WITHOUT COMPLICATION: ICD-10-CM

## 2019-08-05 LAB
ALBUMIN SERPL-MCNC: 4.1 G/DL (ref 3.4–5)
ALBUMIN/GLOB SERPL: 1.1 {RATIO} (ref 1–2)
ALP LIVER SERPL-CCNC: 76 U/L (ref 41–108)
ALT SERPL-CCNC: 36 U/L (ref 13–56)
ANION GAP SERPL CALC-SCNC: 7 MMOL/L (ref 0–18)
AST SERPL-CCNC: 24 U/L (ref 15–37)
BASOPHILS # BLD AUTO: 0.05 X10(3) UL (ref 0–0.2)
BASOPHILS NFR BLD AUTO: 0.5 %
BILIRUB SERPL-MCNC: 0.3 MG/DL (ref 0.1–2)
BUN BLD-MCNC: 19 MG/DL (ref 7–18)
BUN/CREAT SERPL: 16.7 (ref 10–20)
CALCIUM BLD-MCNC: 8.9 MG/DL (ref 8.5–10.1)
CHLORIDE SERPL-SCNC: 108 MMOL/L (ref 98–112)
CO2 SERPL-SCNC: 26 MMOL/L (ref 21–32)
CREAT BLD-MCNC: 1.14 MG/DL (ref 0.55–1.02)
DEPRECATED RDW RBC AUTO: 47.9 FL (ref 35.1–46.3)
EOSINOPHIL # BLD AUTO: 0.06 X10(3) UL (ref 0–0.7)
EOSINOPHIL NFR BLD AUTO: 0.6 %
ERYTHROCYTE [DISTWIDTH] IN BLOOD BY AUTOMATED COUNT: 13.8 % (ref 11–15)
GLOBULIN PLAS-MCNC: 3.7 G/DL (ref 2.8–4.4)
GLUCOSE BLD-MCNC: 89 MG/DL (ref 70–99)
HCT VFR BLD AUTO: 45.7 % (ref 35–48)
HGB BLD-MCNC: 13.9 G/DL (ref 12–16)
IMM GRANULOCYTES # BLD AUTO: 0.07 X10(3) UL (ref 0–1)
IMM GRANULOCYTES NFR BLD: 0.7 %
LYMPHOCYTES # BLD AUTO: 2.18 X10(3) UL (ref 1–4)
LYMPHOCYTES NFR BLD AUTO: 20.7 %
M PROTEIN MFR SERPL ELPH: 7.8 G/DL (ref 6.4–8.2)
MCH RBC QN AUTO: 29 PG (ref 26–34)
MCHC RBC AUTO-ENTMCNC: 30.4 G/DL (ref 31–37)
MCV RBC AUTO: 95.2 FL (ref 80–100)
MONOCYTES # BLD AUTO: 0.71 X10(3) UL (ref 0.1–1)
MONOCYTES NFR BLD AUTO: 6.7 %
NEUTROPHILS # BLD AUTO: 7.48 X10 (3) UL (ref 1.5–7.7)
NEUTROPHILS # BLD AUTO: 7.48 X10(3) UL (ref 1.5–7.7)
NEUTROPHILS NFR BLD AUTO: 70.8 %
OSMOLALITY SERPL CALC.SUM OF ELEC: 294 MOSM/KG (ref 275–295)
PLATELET # BLD AUTO: 242 10(3)UL (ref 150–450)
POTASSIUM SERPL-SCNC: 4.4 MMOL/L (ref 3.5–5.1)
RBC # BLD AUTO: 4.8 X10(6)UL (ref 3.8–5.3)
SODIUM SERPL-SCNC: 141 MMOL/L (ref 136–145)
WBC # BLD AUTO: 10.6 X10(3) UL (ref 4–11)

## 2019-08-05 PROCEDURE — 85025 COMPLETE CBC W/AUTO DIFF WBC: CPT | Performed by: FAMILY MEDICINE

## 2019-08-05 PROCEDURE — 36415 COLL VENOUS BLD VENIPUNCTURE: CPT | Performed by: FAMILY MEDICINE

## 2019-08-05 PROCEDURE — 80053 COMPREHEN METABOLIC PANEL: CPT | Performed by: FAMILY MEDICINE

## 2019-08-07 ENCOUNTER — OFFICE VISIT (OUTPATIENT)
Dept: FAMILY MEDICINE CLINIC | Facility: CLINIC | Age: 54
End: 2019-08-07
Payer: COMMERCIAL

## 2019-08-07 VITALS
WEIGHT: 293 LBS | RESPIRATION RATE: 20 BRPM | HEART RATE: 120 BPM | HEIGHT: 64 IN | BODY MASS INDEX: 50.02 KG/M2 | SYSTOLIC BLOOD PRESSURE: 130 MMHG | DIASTOLIC BLOOD PRESSURE: 86 MMHG | TEMPERATURE: 98 F

## 2019-08-07 DIAGNOSIS — M15.9 PRIMARY OSTEOARTHRITIS INVOLVING MULTIPLE JOINTS: ICD-10-CM

## 2019-08-07 DIAGNOSIS — J45.40 MODERATE PERSISTENT ASTHMA WITHOUT COMPLICATION: ICD-10-CM

## 2019-08-07 DIAGNOSIS — R60.9 FLUID RETENTION: Primary | ICD-10-CM

## 2019-08-07 PROCEDURE — 99213 OFFICE O/P EST LOW 20 MIN: CPT | Performed by: FAMILY MEDICINE

## 2019-08-07 RX ORDER — SUCRALFATE 1 G/1
1 TABLET ORAL 2 TIMES DAILY
COMMUNITY
Start: 2019-07-15 | End: 2019-08-14

## 2019-08-07 NOTE — PROGRESS NOTES
[address]  PROGRESS NOTE  Chief Complaint:   Patient presents with: Follow - Up      HPI:   This is a 52year old female coming in for follow-up. She said that she is not having any side effects with the Spironolactone.   She is starti Neutrophil Absolute 7.48 1.50 - 7.70 x10(3) uL    Lymphocyte Absolute 2.18 1.00 - 4.00 x10(3) uL    Monocyte Absolute 0.71 0.10 - 1.00 x10(3) uL    Eosinophil Absolute 0.06 0.00 - 0.70 x10(3) uL    Basophil Absolute 0.05 0.00 - 0.20 x10(3) uL    Immature G 90 tablet Rfl: 1   predniSONE 5 MG Oral Tab Take 1 tablet by mouth daily.  Disp:  Rfl: 2   Mometasone Furoate (ASMANEX 60 METERED DOSES) 220 MCG/INH Inhalation Aerosol Powder, Breath Activated INHALE 1 PUFF BY MOUTH TWICE DAILY Disp: 3 Inhaler Rfl: 1   Flut mg by mouth daily. Disp:  Rfl: 2   folic acid 1 MG Oral Tab Take 1 mg by mouth daily. Disp:  Rfl: 11   albuterol sulfate (2.5 MG/3ML) 0.083% Inhalation Nebu Soln Take 2.5 mg by nebulization every 4 (four) hours as needed.    Disp:  Rfl:    Carbonyl Iron (FE calculated from the following:    Height as of this encounter: 64\". Weight as of this encounter: 303 lb 8 oz. Vital signs reviewed. Physical Exam:  GEN: She is awake and alert. She looks much better than she did previously.   She seems more engaged Problem List:  Patient Active Problem List:     Allergic rhinitis     Anxiety state     S/P appendectomy     Asthma     Bursitis      delivery delivered     Depression (emotion)     Edema     Encounter for general adult medical examination with

## 2019-08-31 RX ORDER — MONTELUKAST SODIUM 10 MG/1
TABLET ORAL
Qty: 90 TABLET | Refills: 1 | Status: SHIPPED | OUTPATIENT
Start: 2019-08-31 | End: 2019-08-31

## 2019-08-31 RX ORDER — MONTELUKAST SODIUM 10 MG/1
10 TABLET ORAL
Qty: 90 TABLET | Refills: 1 | Status: SHIPPED | OUTPATIENT
Start: 2019-08-31 | End: 2019-10-28

## 2019-08-31 NOTE — TELEPHONE ENCOUNTER
Future appt: Your appointments     Date & Time Appointment Department [facility])    Nov 08, 2019 10:30 AM CST Follow up with Kathren Severe, MD [address], [place] ([facility])            [address] [facility], [address] [place] [address] [address] [place]  [place] [address] [id number]  [phone]        Last Appointment with provider:   8/7/2019  Last appointment at [facility] [place]:  8/7/2019  Cholesterol, Total (mg/dL)   Date Value   06/09/2018 163     HDL Cholesterol (mg/dL)   Date Value   06/09/2018 63     LDL Cholesterol (mg/dL)   Date Value   06/09/2018 81     Triglycerides (mg/dL)   Date Value   06/09/2018 94     No results found for: EAG, A1C  Lab Results   Component Value Date    TSH 1.920 06/09/2018       No follow-ups on file. Return in about 3 months (around 11/7/2019).

## 2019-10-02 RX ORDER — ESOMEPRAZOLE MAGNESIUM 40 MG/1
CAPSULE, DELAYED RELEASE ORAL
Qty: 90 CAPSULE | Refills: 1 | Status: SHIPPED | OUTPATIENT
Start: 2019-10-02 | End: 2020-04-14

## 2019-10-02 NOTE — TELEPHONE ENCOUNTER
Future appt:     Your appointments     Date & Time Appointment Department [facility])    Nov 08, 2019 10:30 AM CST Follow up with Candy Dong MD [address], [place]anael Funez ([facility])            [address] [facility]

## 2019-10-18 ENCOUNTER — TELEPHONE (OUTPATIENT)
Dept: FAMILY MEDICINE CLINIC | Facility: CLINIC | Age: 54
End: 2019-10-18

## 2019-10-18 NOTE — TELEPHONE ENCOUNTER
Received CMN from [employer] for patient's CPAP supplies. Patient's last FELISA f/u was on 8/18/2018. Patient is due for appt. Appt scheduled.      Future Appointments   Date Time Provider Marlene Aiyana   10/28/2019  3:30 PM DEBRA Yañez [facility]

## 2019-10-28 ENCOUNTER — OFFICE VISIT (OUTPATIENT)
Dept: FAMILY MEDICINE CLINIC | Facility: CLINIC | Age: 54
End: 2019-10-28
Payer: COMMERCIAL

## 2019-10-28 VITALS
TEMPERATURE: 98 F | DIASTOLIC BLOOD PRESSURE: 74 MMHG | SYSTOLIC BLOOD PRESSURE: 118 MMHG | BODY MASS INDEX: 50.02 KG/M2 | RESPIRATION RATE: 20 BRPM | OXYGEN SATURATION: 98 % | HEIGHT: 64 IN | HEART RATE: 110 BPM | WEIGHT: 293 LBS

## 2019-10-28 DIAGNOSIS — G47.33 OBSTRUCTIVE SLEEP APNEA SYNDROME: Primary | ICD-10-CM

## 2019-10-28 PROCEDURE — 99214 OFFICE O/P EST MOD 30 MIN: CPT | Performed by: NURSE PRACTITIONER

## 2019-10-28 NOTE — PATIENT INSTRUCTIONS
Continue sleep therapy. Work on sleep hygiene and getting a good sleep cycle. Follow-up in 3 months - sooner if needed.      Advised if still with sleep apnea and not using CPAP has a  7 fold increase in risk of heart attack, stroke, abnormal heart rhyt

## 2019-11-08 ENCOUNTER — LAB ENCOUNTER (OUTPATIENT)
Dept: LAB | Age: 54
End: 2019-11-08
Attending: FAMILY MEDICINE
Payer: COMMERCIAL

## 2019-11-08 ENCOUNTER — OFFICE VISIT (OUTPATIENT)
Dept: FAMILY MEDICINE CLINIC | Facility: CLINIC | Age: 54
End: 2019-11-08
Payer: COMMERCIAL

## 2019-11-08 VITALS
HEIGHT: 64 IN | RESPIRATION RATE: 22 BRPM | TEMPERATURE: 98 F | HEART RATE: 104 BPM | OXYGEN SATURATION: 98 % | BODY MASS INDEX: 50.02 KG/M2 | WEIGHT: 293 LBS | DIASTOLIC BLOOD PRESSURE: 90 MMHG | SYSTOLIC BLOOD PRESSURE: 134 MMHG

## 2019-11-08 DIAGNOSIS — Z23 NEED FOR VACCINATION: ICD-10-CM

## 2019-11-08 DIAGNOSIS — R60.9 FLUID RETENTION: ICD-10-CM

## 2019-11-08 DIAGNOSIS — J45.40 MODERATE PERSISTENT ASTHMA WITHOUT COMPLICATION: ICD-10-CM

## 2019-11-08 DIAGNOSIS — M05.79 RHEUMATOID ARTHRITIS INVOLVING MULTIPLE SITES WITH POSITIVE RHEUMATOID FACTOR (HCC): ICD-10-CM

## 2019-11-08 DIAGNOSIS — J45.40 MODERATE PERSISTENT ASTHMA WITHOUT COMPLICATION: Primary | ICD-10-CM

## 2019-11-08 DIAGNOSIS — E66.01 CLASS 3 SEVERE OBESITY DUE TO EXCESS CALORIES WITH SERIOUS COMORBIDITY AND BODY MASS INDEX (BMI) OF 50.0 TO 59.9 IN ADULT (HCC): ICD-10-CM

## 2019-11-08 PROCEDURE — 90686 IIV4 VACC NO PRSV 0.5 ML IM: CPT | Performed by: FAMILY MEDICINE

## 2019-11-08 PROCEDURE — 99214 OFFICE O/P EST MOD 30 MIN: CPT | Performed by: FAMILY MEDICINE

## 2019-11-08 PROCEDURE — 80053 COMPREHEN METABOLIC PANEL: CPT | Performed by: FAMILY MEDICINE

## 2019-11-08 PROCEDURE — 85025 COMPLETE CBC W/AUTO DIFF WBC: CPT | Performed by: FAMILY MEDICINE

## 2019-11-08 PROCEDURE — 90471 IMMUNIZATION ADMIN: CPT | Performed by: FAMILY MEDICINE

## 2019-11-08 PROCEDURE — 36415 COLL VENOUS BLD VENIPUNCTURE: CPT | Performed by: FAMILY MEDICINE

## 2019-11-08 NOTE — PROGRESS NOTES
[address]  PROGRESS NOTE  Chief Complaint:   Patient presents with: Follow - Up      HPI:   This is a 62year old female coming in for follow-up on her fluid retention.     She said that her rheumatologist stopped her methotrexate and h - 15.0 %    RDW-SD 47.9 (H) 35.1 - 46.3 fL    Neutrophil Absolute Prelim 7.48 1.50 - 7.70 x10 (3) uL    Neutrophil Absolute 7.48 1.50 - 7.70 x10(3) uL    Lymphocyte Absolute 2.18 1.00 - 4.00 x10(3) uL    Monocyte Absolute 0.71 0.10 - 1.00 x10(3) uL    Eosi 1   • furosemide 40 MG Oral Tab Take 1 tablet (40 mg total) by mouth daily. • ESTRADIOL 1 MG Oral Tab TAKE 1 TABLET BY MOUTH EVERY DAY 90 tablet 1   • predniSONE 5 MG Oral Tab Take 1 tablet by mouth daily.   2   • Mometasone Furoate (ASMANEX 60 METERED 45 mg by mouth 2 (two) times daily. • Nebulizers (NEBULIZER COMPRESSOR) Does not apply Misc         Counseling given: Not Answered       REVIEW OF SYSTEMS:   CONSTITUTIONAL: She is slowly gaining weight. She denies any fever or chills.   EENT:  Eyes: assistance. She is walking with a cane. HEENT:  Head:  Normocephalic, atraumatic Eyes: EOMI, PERRLA, no scleral icterus, conjunctivae clear bilaterally, no eye discharge Ears: Ear canals clear bilaterally.   Tympanic membranes are normal.  Nose: patent, n weight.       Meds & Refills for this Visit:  Requested Prescriptions      No prescriptions requested or ordered in this encounter       Health Maintenance:  Asthma Action Plan due on 10/14/1963  Asthma Control Test due on 10/14/1963  Annual Physical due on

## 2019-11-08 NOTE — PATIENT INSTRUCTIONS
Please restart the Asmanex and use it regularly and consistently every day. Check labs today. Flu shot today.

## 2019-11-09 ENCOUNTER — TELEPHONE (OUTPATIENT)
Dept: FAMILY MEDICINE CLINIC | Facility: CLINIC | Age: 54
End: 2019-11-09

## 2019-11-09 NOTE — TELEPHONE ENCOUNTER
----- Message from Michelle Shah MD sent at 11/9/2019  8:11 AM CST -----  The labs look similar to 3 months ago. Potassium is normal.  Kidney function is slightly better than last time. Last time the GFR was 54. This time the GFR is 55.   Hemoglobin

## 2019-11-11 RX ORDER — BUPROPION HYDROCHLORIDE 150 MG/1
TABLET ORAL
Qty: 180 TABLET | Refills: 1 | Status: SHIPPED | OUTPATIENT
Start: 2019-11-11 | End: 2019-12-10

## 2019-11-11 RX ORDER — ATOMOXETINE 60 MG/1
CAPSULE ORAL
Qty: 90 CAPSULE | Refills: 1 | Status: SHIPPED | OUTPATIENT
Start: 2019-11-11 | End: 2019-12-10

## 2019-11-11 RX ORDER — MIRTAZAPINE 30 MG/1
TABLET, FILM COATED ORAL
Qty: 90 TABLET | Refills: 1 | Status: SHIPPED | OUTPATIENT
Start: 2019-11-11 | End: 2019-12-10

## 2019-11-11 RX ORDER — ATORVASTATIN CALCIUM 20 MG/1
TABLET, FILM COATED ORAL
Qty: 90 TABLET | Refills: 1 | Status: SHIPPED | OUTPATIENT
Start: 2019-11-11 | End: 2020-08-31

## 2019-11-11 NOTE — TELEPHONE ENCOUNTER
Future appt:     Your appointments     Date & Time Appointment Department [facility])    Feb 03, 2020  3:00 PM CST Sleep Follow Up with [address], [address], [address], Hansa Jasso ([place])        Feb 07, 2020  3:3

## 2019-11-11 NOTE — TELEPHONE ENCOUNTER
Future appt:     Your appointments     Date & Time Appointment Department [facility])    Feb 03, 2020  3:00 PM CST Sleep Follow Up with [address], Pedro Brambila, [address], Lory [place])        Feb 07, 2020  3:3

## 2019-12-09 ENCOUNTER — TELEPHONE (OUTPATIENT)
Dept: FAMILY MEDICINE CLINIC | Facility: CLINIC | Age: 54
End: 2019-12-09

## 2019-12-09 NOTE — TELEPHONE ENCOUNTER
As above. Advised appt today at [facility] for [facility]. Patient states she will wait for appt tomorrow with  . Jacinto Becerril, 12/09/19, 12:04 PM    Future appt:     Your appointments     Date & Time Appointment Department [facility])    Dec

## 2019-12-09 NOTE — TELEPHONE ENCOUNTER
Patient states that she has a bad sinus infection, scheduled appointment for tomorrow w/ Dr Nona Johnson but wants to know if she can come in today- no openings.

## 2019-12-10 ENCOUNTER — OFFICE VISIT (OUTPATIENT)
Dept: FAMILY MEDICINE CLINIC | Facility: CLINIC | Age: 54
End: 2019-12-10
Payer: COMMERCIAL

## 2019-12-10 VITALS
SYSTOLIC BLOOD PRESSURE: 140 MMHG | WEIGHT: 293 LBS | TEMPERATURE: 99 F | RESPIRATION RATE: 22 BRPM | BODY MASS INDEX: 50.02 KG/M2 | OXYGEN SATURATION: 96 % | HEIGHT: 64 IN | DIASTOLIC BLOOD PRESSURE: 78 MMHG | HEART RATE: 118 BPM

## 2019-12-10 DIAGNOSIS — J45.40 MODERATE PERSISTENT ASTHMA WITHOUT COMPLICATION: ICD-10-CM

## 2019-12-10 DIAGNOSIS — J01.00 ACUTE NON-RECURRENT MAXILLARY SINUSITIS: Primary | ICD-10-CM

## 2019-12-10 PROCEDURE — 99214 OFFICE O/P EST MOD 30 MIN: CPT | Performed by: NURSE PRACTITIONER

## 2019-12-10 RX ORDER — CEFUROXIME AXETIL 500 MG/1
500 TABLET ORAL 2 TIMES DAILY
Qty: 20 TABLET | Refills: 0 | Status: SHIPPED | OUTPATIENT
Start: 2019-12-10 | End: 2019-12-20

## 2019-12-10 NOTE — PATIENT INSTRUCTIONS
Rest, increase fluids, salt water gargles ,john pot (use distilled water) or saline nasal spray, Mucinex,, Coricidin HBP, Halls, vicks vapo rub,  Tylenol/Ibuprofen, follow up if symptoms persist or increase.

## 2019-12-10 NOTE — PROGRESS NOTES
CHIEF COMPLAINT:   Patient presents with:  Ear Pain  Sore Throat  Nasal Congestion      HPI:   Freida Zavala is a 62year old female who presents to clinic today with complaints of feeling ill since 12/1 - started with sore throat- then ear pain, - snee • TraMADol HCl 50 MG Oral Tab Take 1 tablet (50 mg total) by mouth every 4 (four) hours as needed for Pain.  30 tablet 0   • MONTELUKAST SODIUM 10 MG Oral Tab TAKE 1 TABLET DAILY 90 tablet 3   • Cholecalciferol (VITAMIN D) 1000 units Oral Tab Take 1,000 m 22   Ht 64\"   Wt (!) 309 lb (140.2 kg)   SpO2 96%   BMI 53.04 kg/m²   GENERAL: well developed, well nourished,in no apparent distress  HEAD:  mild tenderness on palpation of maxillary sinuses  EYES: conjunctiva clear, no discharge  EARS:.  TMs bilaterally

## 2019-12-28 NOTE — TELEPHONE ENCOUNTER
Future appt:     Your appointments     Date & Time Appointment Department [facility])    Feb 03, 2020  3:00 PM CST Sleep Follow Up with [address], Emil Ball, [address], [address] ([place])        Feb 07, 2020  3:3

## 2019-12-30 RX ORDER — ESTRADIOL 1 MG/1
TABLET ORAL
Qty: 90 TABLET | Refills: 1 | Status: SHIPPED | OUTPATIENT
Start: 2019-12-30 | End: 2020-07-13

## 2020-01-14 RX ORDER — SPIRONOLACTONE 25 MG/1
TABLET ORAL
Qty: 90 TABLET | Refills: 1 | Status: SHIPPED | OUTPATIENT
Start: 2020-01-14 | End: 2020-07-13

## 2020-01-15 ENCOUNTER — TELEPHONE (OUTPATIENT)
Dept: FAMILY MEDICINE CLINIC | Facility: CLINIC | Age: 55
End: 2020-01-15

## 2020-01-15 NOTE — TELEPHONE ENCOUNTER
Per Terry Tomlinson-  Calling per protocol due to diastolic being over 90. Patient asymptomatic. HR slightly elevated.     Servando Molina, 01/15/20, 4:55 PM

## 2020-01-15 NOTE — TELEPHONE ENCOUNTER
Please advise refill of Spironolactone 25mg. Last Rx: 7/10/19      Future appt:     Your appointments     Date & Time Appointment Department [facility])    Jan 28, 2020  3:00 PM CST Follow Up Visit with Hansel Leventhal, MD [address] [place] [address]

## 2020-01-15 NOTE — TELEPHONE ENCOUNTER
FYI: pt's bp was taken at 3:20pm today and was 137/99- home nurse wanted to let dr know- stated it wasn't critically high  but higher than pt's normal readings

## 2020-01-23 NOTE — TELEPHONE ENCOUNTER
Future appt:     Your appointments     Date & Time Appointment Department [facility])    Jan 28, 2020  3:00 PM CST Follow Up Visit with Lucrecia Carmichael MD [address] [place] [address], [facility])        Feb 03, 2020  3:00

## 2020-01-24 RX ORDER — MONTELUKAST SODIUM 10 MG/1
TABLET ORAL
Qty: 90 TABLET | Refills: 1 | Status: SHIPPED | OUTPATIENT
Start: 2020-01-24 | End: 2020-11-13

## 2020-01-28 ENCOUNTER — APPOINTMENT (OUTPATIENT)
Dept: LAB | Age: 55
End: 2020-01-28
Attending: FAMILY MEDICINE
Payer: COMMERCIAL

## 2020-01-28 ENCOUNTER — OFFICE VISIT (OUTPATIENT)
Dept: FAMILY MEDICINE CLINIC | Facility: CLINIC | Age: 55
End: 2020-01-28
Payer: COMMERCIAL

## 2020-01-28 VITALS
BODY MASS INDEX: 50.02 KG/M2 | WEIGHT: 293 LBS | HEART RATE: 120 BPM | SYSTOLIC BLOOD PRESSURE: 132 MMHG | RESPIRATION RATE: 20 BRPM | HEIGHT: 64 IN | TEMPERATURE: 100 F | DIASTOLIC BLOOD PRESSURE: 94 MMHG

## 2020-01-28 DIAGNOSIS — J30.1 SEASONAL ALLERGIC RHINITIS DUE TO POLLEN: ICD-10-CM

## 2020-01-28 DIAGNOSIS — A41.9 SEPSIS WITHOUT ACUTE ORGAN DYSFUNCTION, DUE TO UNSPECIFIED ORGANISM (HCC): Primary | ICD-10-CM

## 2020-01-28 DIAGNOSIS — R00.0 SINUS TACHYCARDIA: ICD-10-CM

## 2020-01-28 DIAGNOSIS — M15.9 PRIMARY OSTEOARTHRITIS INVOLVING MULTIPLE JOINTS: ICD-10-CM

## 2020-01-28 DIAGNOSIS — I83.93 ASYMPTOMATIC VARICOSE VEINS OF BOTH LOWER EXTREMITIES: ICD-10-CM

## 2020-01-28 DIAGNOSIS — J45.40 MODERATE PERSISTENT ASTHMA WITHOUT COMPLICATION: ICD-10-CM

## 2020-01-28 DIAGNOSIS — F33.1 MODERATE EPISODE OF RECURRENT MAJOR DEPRESSIVE DISORDER (HCC): ICD-10-CM

## 2020-01-28 DIAGNOSIS — M05.79 RHEUMATOID ARTHRITIS INVOLVING MULTIPLE SITES WITH POSITIVE RHEUMATOID FACTOR (HCC): ICD-10-CM

## 2020-01-28 PROBLEM — E87.6 HYPOKALEMIA: Status: ACTIVE | Noted: 2020-01-04

## 2020-01-28 PROBLEM — K52.9 COLITIS: Status: ACTIVE | Noted: 2019-12-31

## 2020-01-28 PROBLEM — R07.2 PRECORDIAL PAIN: Status: ACTIVE | Noted: 2020-01-08

## 2020-01-28 LAB
ALBUMIN SERPL-MCNC: 4 G/DL (ref 3.4–5)
ALBUMIN/GLOB SERPL: 0.9 {RATIO} (ref 1–2)
ALP LIVER SERPL-CCNC: 69 U/L (ref 46–118)
ALT SERPL-CCNC: 34 U/L (ref 13–56)
ANION GAP SERPL CALC-SCNC: 7 MMOL/L (ref 0–18)
AST SERPL-CCNC: 21 U/L (ref 15–37)
BASOPHILS # BLD AUTO: 0.08 X10(3) UL (ref 0–0.2)
BASOPHILS NFR BLD AUTO: 0.6 %
BILIRUB SERPL-MCNC: 0.3 MG/DL (ref 0.1–2)
BUN BLD-MCNC: 16 MG/DL (ref 7–18)
BUN/CREAT SERPL: 14 (ref 10–20)
CALCIUM BLD-MCNC: 10 MG/DL (ref 8.5–10.1)
CHLORIDE SERPL-SCNC: 108 MMOL/L (ref 98–112)
CO2 SERPL-SCNC: 25 MMOL/L (ref 21–32)
CREAT BLD-MCNC: 1.14 MG/DL (ref 0.55–1.02)
DEPRECATED RDW RBC AUTO: 43.2 FL (ref 35.1–46.3)
EOSINOPHIL # BLD AUTO: 0.09 X10(3) UL (ref 0–0.7)
EOSINOPHIL NFR BLD AUTO: 0.7 %
ERYTHROCYTE [DISTWIDTH] IN BLOOD BY AUTOMATED COUNT: 13.1 % (ref 11–15)
GLOBULIN PLAS-MCNC: 4.6 G/DL (ref 2.8–4.4)
GLUCOSE BLD-MCNC: 126 MG/DL (ref 70–99)
HCT VFR BLD AUTO: 48.3 % (ref 35–48)
HGB BLD-MCNC: 15 G/DL (ref 12–16)
IMM GRANULOCYTES # BLD AUTO: 0.09 X10(3) UL (ref 0–1)
IMM GRANULOCYTES NFR BLD: 0.7 %
LYMPHOCYTES # BLD AUTO: 2.41 X10(3) UL (ref 1–4)
LYMPHOCYTES NFR BLD AUTO: 17.9 %
M PROTEIN MFR SERPL ELPH: 8.6 G/DL (ref 6.4–8.2)
MCH RBC QN AUTO: 28.1 PG (ref 26–34)
MCHC RBC AUTO-ENTMCNC: 31.1 G/DL (ref 31–37)
MCV RBC AUTO: 90.6 FL (ref 80–100)
MONOCYTES # BLD AUTO: 0.88 X10(3) UL (ref 0.1–1)
MONOCYTES NFR BLD AUTO: 6.5 %
NEUTROPHILS # BLD AUTO: 9.93 X10 (3) UL (ref 1.5–7.7)
NEUTROPHILS # BLD AUTO: 9.93 X10(3) UL (ref 1.5–7.7)
NEUTROPHILS NFR BLD AUTO: 73.6 %
OSMOLALITY SERPL CALC.SUM OF ELEC: 293 MOSM/KG (ref 275–295)
PATIENT FASTING Y/N/NP: NO
PLATELET # BLD AUTO: 264 10(3)UL (ref 150–450)
POTASSIUM SERPL-SCNC: 4.3 MMOL/L (ref 3.5–5.1)
RBC # BLD AUTO: 5.33 X10(6)UL (ref 3.8–5.3)
SODIUM SERPL-SCNC: 140 MMOL/L (ref 136–145)
WBC # BLD AUTO: 13.5 X10(3) UL (ref 4–11)

## 2020-01-28 PROCEDURE — 36415 COLL VENOUS BLD VENIPUNCTURE: CPT | Performed by: FAMILY MEDICINE

## 2020-01-28 PROCEDURE — 99214 OFFICE O/P EST MOD 30 MIN: CPT | Performed by: FAMILY MEDICINE

## 2020-01-28 PROCEDURE — 1111F DSCHRG MED/CURRENT MED MERGE: CPT | Performed by: FAMILY MEDICINE

## 2020-01-28 PROCEDURE — 80053 COMPREHEN METABOLIC PANEL: CPT | Performed by: FAMILY MEDICINE

## 2020-01-28 PROCEDURE — 85025 COMPLETE CBC W/AUTO DIFF WBC: CPT | Performed by: FAMILY MEDICINE

## 2020-01-28 RX ORDER — DICYCLOMINE HCL 20 MG
20 TABLET ORAL EVERY 6 HOURS PRN
COMMUNITY

## 2020-01-28 RX ORDER — CHLORAL HYDRATE 500 MG
1000 CAPSULE ORAL DAILY
COMMUNITY

## 2020-01-28 NOTE — PROGRESS NOTES
[facility] [place]  PROGRESS NOTE  Chief Complaint:   Patient presents with:  Hospital F/U      HPI:   This is a 62year old female coming in for follow-up from her hospitalization.   She was hospitalized at [facility] from December 31 t 10.1 mg/dL    Calculated Osmolality 298 (H) 275 - 295 mOsm/kg    GFR, Non- 55 (L) >=60    GFR, -American 63 >=60    AST 35 15 - 37 U/L    ALT 37 13 - 56 U/L    Alkaline Phosphatase 75 46 - 118 U/L    Bilirubin, Total 0.4 0.1 - 2.0 mg twice a year    Drug use: No    Family History:  Family History   Problem Relation Age of Onset   • Breast Cancer Maternal Grandmother 61        approx age     Allergies:    Nitrofurantoin            Norfloxacin               Penicillin G              Phen 1 tablet (1 mg total) by mouth 3 (three) times daily as needed for Anxiety. 30 tablet 0   • TraMADol HCl 50 MG Oral Tab Take 1 tablet (50 mg total) by mouth every 4 (four) hours as needed for Pain.  30 tablet 0   • Cholecalciferol (VITAMIN D) 1000 units Ora of splenectomy. PSYCHIATRIC: She does have depression and anxiety. The situation with her health insurance has made all of that worse. ENDOCRINOLOGIC:  Denies excessive sweating, cold or heat intolerance, polyuria or polydipsia.   ALLERGIES: She does hav that her potassium and hemoglobin are normal.  No new treatment recommended today. She has completed the full antibiotic course and is not taking antibiotics now. - CBC WITH DIFFERENTIAL WITH PLATELET; Future  - COMP METABOLIC PANEL (14); Future    2.  Rh Action Plan due on 10/14/1963  Asthma Control Test due on 10/14/1963  Annual Physical due on 06/09/2019    Patient/Caregiver Education: Patient/Caregiver Education: There are no barriers to learning. Medical education done.    Outcome: Patient verbalizes un

## 2020-01-29 ENCOUNTER — TELEPHONE (OUTPATIENT)
Dept: FAMILY MEDICINE CLINIC | Facility: CLINIC | Age: 55
End: 2020-01-29

## 2020-01-29 NOTE — TELEPHONE ENCOUNTER
----- Message from Noreen Jorgensen MD sent at 1/29/2020  8:32 AM CST -----  The blood sugar is slightly elevated but that is not unexpected because this was a nonfasting blood test.  Potassium level is normal at 4.3.   Kidney function is slightly less jf

## 2020-01-30 NOTE — TELEPHONE ENCOUNTER
Future appt:     Your appointments     Date & Time Appointment Department [facility])    May 12, 2020  3:30 PM CDT Physical - Established with Jessica Adam MD [address] [place], [place] [address]way, [place]

## 2020-01-31 RX ORDER — TRAMADOL HYDROCHLORIDE 50 MG/1
TABLET ORAL
Qty: 30 TABLET | Refills: 0 | Status: SHIPPED | OUTPATIENT
Start: 2020-01-31 | End: 2020-04-14

## 2020-02-05 NOTE — TELEPHONE ENCOUNTER
Patient informed of below. Expressed understanding. She is currently in [address] [place] [address].   Lissette Aguilar, 02/05/20, 11:10 AM

## 2020-04-14 RX ORDER — ESOMEPRAZOLE MAGNESIUM 40 MG/1
CAPSULE, DELAYED RELEASE ORAL
Qty: 90 CAPSULE | Refills: 1 | Status: SHIPPED | OUTPATIENT
Start: 2020-04-14 | End: 2020-06-09

## 2020-04-14 RX ORDER — TRAMADOL HYDROCHLORIDE 50 MG/1
TABLET ORAL
Qty: 30 TABLET | Refills: 0 | Status: SHIPPED | OUTPATIENT
Start: 2020-04-14 | End: 2020-06-18

## 2020-04-14 NOTE — TELEPHONE ENCOUNTER
Future appt:     Your appointments     Date & Time Appointment Department [facility])    May 12, 2020  3:30 PM CDT Physical - Established with Crystal Tomas MD [address] [place], [place] [address]ore [facility])            [facility]

## 2020-04-29 RX ORDER — MIRTAZAPINE 30 MG/1
TABLET, FILM COATED ORAL
Qty: 90 TABLET | Refills: 1 | Status: SHIPPED | OUTPATIENT
Start: 2020-04-29 | End: 2020-10-22

## 2020-04-29 NOTE — TELEPHONE ENCOUNTER
Future appt:     Your appointments     Date & Time Appointment Department [facility])    May 12, 2020  3:30 PM CDT Physical - Established with Rogelio Rai MD [address] [place] [address]

## 2020-05-18 RX ORDER — FUROSEMIDE 40 MG/1
TABLET ORAL
Qty: 180 TABLET | Refills: 1 | Status: SHIPPED | OUTPATIENT
Start: 2020-05-18 | End: 2020-08-28

## 2020-05-18 NOTE — TELEPHONE ENCOUNTER
Future appt:     Your appointments     Date & Time Appointment Department [facility])    Jun 09, 2020  4:00 PM CDT Physical - Established with Yulisa Stanford MD [address] [place] [address], [facility])            [facility]

## 2020-06-09 ENCOUNTER — OFFICE VISIT (OUTPATIENT)
Dept: FAMILY MEDICINE CLINIC | Facility: CLINIC | Age: 55
End: 2020-06-09
Payer: COMMERCIAL

## 2020-06-09 VITALS
SYSTOLIC BLOOD PRESSURE: 150 MMHG | OXYGEN SATURATION: 96 % | HEART RATE: 108 BPM | TEMPERATURE: 99 F | RESPIRATION RATE: 18 BRPM | BODY MASS INDEX: 50.02 KG/M2 | DIASTOLIC BLOOD PRESSURE: 80 MMHG | HEIGHT: 64 IN | WEIGHT: 293 LBS

## 2020-06-09 DIAGNOSIS — E66.01 CLASS 3 SEVERE OBESITY DUE TO EXCESS CALORIES WITH SERIOUS COMORBIDITY AND BODY MASS INDEX (BMI) OF 50.0 TO 59.9 IN ADULT (HCC): ICD-10-CM

## 2020-06-09 DIAGNOSIS — R60.1 GENERALIZED EDEMA: ICD-10-CM

## 2020-06-09 DIAGNOSIS — Z00.01 ENCOUNTER FOR GENERAL ADULT MEDICAL EXAMINATION WITH ABNORMAL FINDINGS: Primary | ICD-10-CM

## 2020-06-09 DIAGNOSIS — E78.49 FAMILIAL MULTIPLE LIPOPROTEIN-TYPE HYPERLIPIDEMIA: ICD-10-CM

## 2020-06-09 DIAGNOSIS — M05.79 RHEUMATOID ARTHRITIS INVOLVING MULTIPLE SITES WITH POSITIVE RHEUMATOID FACTOR (HCC): ICD-10-CM

## 2020-06-09 DIAGNOSIS — F33.1 MODERATE EPISODE OF RECURRENT MAJOR DEPRESSIVE DISORDER (HCC): ICD-10-CM

## 2020-06-09 DIAGNOSIS — J45.40 MODERATE PERSISTENT ASTHMA WITHOUT COMPLICATION: ICD-10-CM

## 2020-06-09 PROBLEM — K52.9 GASTROENTERITIS: Status: ACTIVE | Noted: 2020-02-04

## 2020-06-09 PROBLEM — R10.9 ABDOMINAL PAIN: Status: ACTIVE | Noted: 2020-03-02

## 2020-06-09 PROCEDURE — 99396 PREV VISIT EST AGE 40-64: CPT | Performed by: FAMILY MEDICINE

## 2020-06-09 NOTE — PROGRESS NOTES
[facility] [place]  PROGRESS NOTE  Chief Complaint:   Patient presents with:  Physical: General   Other: having trouble empting out bladder      HPI:   This is a 62year old female coming in for a general physical.      She is excited because sh -American 62 >=60    AST 21 15 - 37 U/L    ALT 34 13 - 56 U/L    Alkaline Phosphatase 69 46 - 118 U/L    Bilirubin, Total 0.3 0.1 - 2.0 mg/dL    Total Protein 8.6 (H) 6.4 - 8.2 g/dL    Albumin 4.0 3.4 - 5.0 g/dL    Globulin  4.6 (H) 2.8 - 4.4 g/dL Age of Onset   • Breast Cancer Maternal Grandmother 61        approx age     Allergies:    Nitrofurantoin            Norfloxacin               Penicillin G              Phenobarbital             Sulfa Antibiotics         Adhesive Tape           RASH  Latex (PROAIR HFA) 108 (90 Base) MCG/ACT Inhalation Aero Soln Inhale 2 puffs into the lungs every 4 (four) hours as needed. 3 Inhaler 3   • Calcium Citrate-Vitamin D (CITRACAL/VITAMIN D OR) Take 1 tablet by mouth daily.      • ClonazePAM 1 MG Oral Tab Take 1 tabl syncope, paralysis, ataxia, numbness or tingling in the extremities,change in bowel or bladder control. HEMATOLOGIC:  Denies anemia, bleeding or bruising. LYMPHATICS:  Denies enlarged nodes or history of splenectomy.   PSYCHIATRIC: She does have chronic d annual wellness exam.  She will be due for a mammogram at the end of June. She is up to date on her immunizations. 2. Moderate persistent asthma without complication  Her asthma is fairly well controlled with her current medications.   No new medicine Bursitis      delivery delivered     Depression     Edema     Encounter for general adult medical examination with abnormal findings     Pain in joint, pelvic region and thigh     Familial multiple lipoprotein-type hyperlipidemia     Low back pain

## 2020-06-18 RX ORDER — TRAMADOL HYDROCHLORIDE 50 MG/1
TABLET ORAL
Qty: 30 TABLET | Refills: 0 | Status: SHIPPED | OUTPATIENT
Start: 2020-06-18 | End: 2021-09-28

## 2020-06-18 NOTE — TELEPHONE ENCOUNTER
Future appt:     Your appointments     Date & Time Appointment Department [facility])    Aug 10, 2020  3:30 PM CDT Follow up Visit with Candy Dong MD [address] [facility], [facility] ([facility])    Please arrive 15 minut

## 2020-07-13 RX ORDER — SPIRONOLACTONE 25 MG/1
TABLET ORAL
Qty: 90 TABLET | Refills: 1 | Status: SHIPPED | OUTPATIENT
Start: 2020-07-13 | End: 2020-10-12

## 2020-07-13 RX ORDER — ESTRADIOL 1 MG/1
TABLET ORAL
Qty: 90 TABLET | Refills: 1 | Status: SHIPPED | OUTPATIENT
Start: 2020-07-13 | End: 2020-10-12

## 2020-07-13 NOTE — TELEPHONE ENCOUNTER
Future appt:     Your appointments     Date & Time Appointment Department [facility])    Aug 10, 2020  3:30 PM CDT Follow up Visit with Bobbi Otero MD [address], Pranav Pandey ([place])    Please arrive 15 minut

## 2020-08-28 ENCOUNTER — OFFICE VISIT (OUTPATIENT)
Dept: FAMILY MEDICINE CLINIC | Facility: CLINIC | Age: 55
End: 2020-08-28
Payer: COMMERCIAL

## 2020-08-28 VITALS
HEIGHT: 64 IN | TEMPERATURE: 97 F | BODY MASS INDEX: 50.02 KG/M2 | HEART RATE: 112 BPM | OXYGEN SATURATION: 97 % | SYSTOLIC BLOOD PRESSURE: 144 MMHG | RESPIRATION RATE: 18 BRPM | WEIGHT: 293 LBS | DIASTOLIC BLOOD PRESSURE: 90 MMHG

## 2020-08-28 DIAGNOSIS — R30.0 DYSURIA: Primary | ICD-10-CM

## 2020-08-28 DIAGNOSIS — N30.01 ACUTE CYSTITIS WITH HEMATURIA: ICD-10-CM

## 2020-08-28 LAB
BILIRUB UR QL STRIP.AUTO: NEGATIVE
COLOR UR AUTO: YELLOW
GLUCOSE UR STRIP.AUTO-MCNC: NEGATIVE MG/DL
KETONES UR STRIP.AUTO-MCNC: NEGATIVE MG/DL
MULTISTIX LOT#: NORMAL NUMERIC
NITRITE UR QL STRIP.AUTO: NEGATIVE
PH UR STRIP.AUTO: 5 [PH] (ref 4.5–8)
PH, URINE: 5.5 (ref 4.5–8)
PROT UR STRIP.AUTO-MCNC: 100 MG/DL
PROTEIN (URINE DIPSTICK): 100 MG/DL
RBC #/AREA URNS AUTO: >10 /HPF
SP GR UR STRIP.AUTO: 1.02 (ref 1–1.03)
SPECIFIC GRAVITY: 1.03 (ref 1–1.03)
URINE-COLOR: YELLOW
UROBILINOGEN UR STRIP.AUTO-MCNC: <2 MG/DL
UROBILINOGEN,SEMI-QN: 0.2 MG/DL (ref 0–1.9)
WBC #/AREA URNS AUTO: >50 /HPF
WBC CLUMPS UR QL AUTO: PRESENT

## 2020-08-28 PROCEDURE — 81003 URINALYSIS AUTO W/O SCOPE: CPT | Performed by: NURSE PRACTITIONER

## 2020-08-28 PROCEDURE — 3008F BODY MASS INDEX DOCD: CPT | Performed by: NURSE PRACTITIONER

## 2020-08-28 PROCEDURE — 87086 URINE CULTURE/COLONY COUNT: CPT | Performed by: NURSE PRACTITIONER

## 2020-08-28 PROCEDURE — 81001 URINALYSIS AUTO W/SCOPE: CPT | Performed by: NURSE PRACTITIONER

## 2020-08-28 PROCEDURE — 3080F DIAST BP >= 90 MM HG: CPT | Performed by: NURSE PRACTITIONER

## 2020-08-28 PROCEDURE — 99214 OFFICE O/P EST MOD 30 MIN: CPT | Performed by: NURSE PRACTITIONER

## 2020-08-28 PROCEDURE — 87077 CULTURE AEROBIC IDENTIFY: CPT | Performed by: NURSE PRACTITIONER

## 2020-08-28 PROCEDURE — 3077F SYST BP >= 140 MM HG: CPT | Performed by: NURSE PRACTITIONER

## 2020-08-28 PROCEDURE — 87186 SC STD MICRODIL/AGAR DIL: CPT | Performed by: NURSE PRACTITIONER

## 2020-08-28 RX ORDER — CEPHALEXIN 500 MG/1
500 CAPSULE ORAL 3 TIMES DAILY
Qty: 30 CAPSULE | Refills: 0 | Status: SHIPPED | OUTPATIENT
Start: 2020-08-28 | End: 2020-09-07

## 2020-08-28 RX ORDER — TOFACITINIB 11 MG/1
11 TABLET, FILM COATED, EXTENDED RELEASE ORAL DAILY
COMMUNITY
Start: 2020-08-21

## 2020-08-28 NOTE — PROGRESS NOTES
Patient ID:   Jyothi Duncan  is a 62year old female    Allergies    Nitrofurantoin            Norfloxacin               Penicillin G              Phenobarbital             Sulfa Antibiotics         Adhesive Tape           RASH  Latex                   ANA M Tab, Take 1 tablet by mouth daily. , Disp: , Rfl: 2  Mometasone Furoate (ASMANEX 60 METERED DOSES) 220 MCG/INH Inhalation Aerosol Powder, Breath Activated, INHALE 1 PUFF BY MOUTH TWICE DAILY, Disp: 3 Inhaler, Rfl: 1  Fluticasone Propionate 50 MCG/ACT Nasal constipation,  No fever, some nausea, no vomiting. Taking Xeljanz     dysuria, urinary frequency, urgency, flank pain, suprapubic pain, fever, nausea, vomiting, hematuria, malodorous urine, cloudy urine.         Review of Systems   GENERAL: feels good, go without Micro      UA/M With Culture Reflex [E]      Urine Culture, Routine      Meds & Refills for this Visit:  Requested Prescriptions     Signed Prescriptions Disp Refills   • cephALEXin (KEFLEX) 500 MG Oral Cap 30 capsule 0     Sig: Take 1 capsule (500

## 2020-08-31 ENCOUNTER — OFFICE VISIT (OUTPATIENT)
Dept: FAMILY MEDICINE CLINIC | Facility: CLINIC | Age: 55
End: 2020-08-31
Payer: COMMERCIAL

## 2020-08-31 VITALS
WEIGHT: 293 LBS | TEMPERATURE: 98 F | RESPIRATION RATE: 20 BRPM | DIASTOLIC BLOOD PRESSURE: 72 MMHG | OXYGEN SATURATION: 99 % | HEART RATE: 100 BPM | BODY MASS INDEX: 50.02 KG/M2 | SYSTOLIC BLOOD PRESSURE: 138 MMHG | HEIGHT: 64 IN

## 2020-08-31 DIAGNOSIS — J45.40 MODERATE PERSISTENT ASTHMA WITHOUT COMPLICATION: Primary | ICD-10-CM

## 2020-08-31 DIAGNOSIS — E78.49 FAMILIAL MULTIPLE LIPOPROTEIN-TYPE HYPERLIPIDEMIA: ICD-10-CM

## 2020-08-31 DIAGNOSIS — F33.1 MODERATE EPISODE OF RECURRENT MAJOR DEPRESSIVE DISORDER (HCC): ICD-10-CM

## 2020-08-31 DIAGNOSIS — K52.9 GASTROENTERITIS: ICD-10-CM

## 2020-08-31 DIAGNOSIS — E66.01 CLASS 3 SEVERE OBESITY DUE TO EXCESS CALORIES WITH SERIOUS COMORBIDITY AND BODY MASS INDEX (BMI) OF 50.0 TO 59.9 IN ADULT (HCC): ICD-10-CM

## 2020-08-31 DIAGNOSIS — M05.79 RHEUMATOID ARTHRITIS INVOLVING MULTIPLE SITES WITH POSITIVE RHEUMATOID FACTOR (HCC): ICD-10-CM

## 2020-08-31 PROCEDURE — 3078F DIAST BP <80 MM HG: CPT | Performed by: FAMILY MEDICINE

## 2020-08-31 PROCEDURE — 3075F SYST BP GE 130 - 139MM HG: CPT | Performed by: FAMILY MEDICINE

## 2020-08-31 PROCEDURE — 99214 OFFICE O/P EST MOD 30 MIN: CPT | Performed by: FAMILY MEDICINE

## 2020-08-31 PROCEDURE — 3008F BODY MASS INDEX DOCD: CPT | Performed by: FAMILY MEDICINE

## 2020-08-31 RX ORDER — ATOMOXETINE 60 MG/1
CAPSULE ORAL
Qty: 90 CAPSULE | Refills: 1 | Status: SHIPPED | OUTPATIENT
Start: 2020-08-31 | End: 2021-01-25

## 2020-08-31 RX ORDER — BUPROPION HYDROCHLORIDE 150 MG/1
TABLET ORAL
Qty: 180 TABLET | Refills: 1 | Status: SHIPPED | OUTPATIENT
Start: 2020-08-31 | End: 2021-01-25

## 2020-08-31 RX ORDER — ATORVASTATIN CALCIUM 20 MG/1
TABLET, FILM COATED ORAL
Qty: 90 TABLET | Refills: 1 | Status: SHIPPED | OUTPATIENT
Start: 2020-08-31 | End: 2021-01-25

## 2020-08-31 NOTE — TELEPHONE ENCOUNTER
Future appt:     Your appointments     Date & Time Appointment Department [facility])    Aug 31, 2020  3:30 PM CDT Follow up Visit with Lucrecia Carmichael MD [address] [facility], [facility] ([facility])    Please arrive 15 minut

## 2020-08-31 NOTE — PROGRESS NOTES
[address]  PROGRESS NOTE  Chief Complaint:   Patient presents with: Follow - Up      HPI:   This is a 62year old female coming in for follow-up. Since her last visit, she has started on Scott Faden.   She is not sure if she feels very d /LPF    Renal Tubular Epithelial Cells None Seen Small /LPF    Transitional Cells None Seen Small /LPF    Mucous Urine 1+ (A) None Seen    Yeast Urine None Seen None Seen    WBC Clump Present (A) None Seen   URINE CULTURE, ROUTINE   Result Value Ref Range ER, XL, 150 MG Oral Tablet 24 Hr TAKE 1 TABLET TWICE A  tablet 1   • ATORVASTATIN 20 MG Oral Tab TAKE 1 TABLET NIGHTLY 90 tablet 1   • ATOMOXETINE HCL 60 MG Oral Cap TAKE 1 CAPSULE DAILY 90 capsule 1   • XELJANZ XR 11 MG Oral Tablet 24 Hr      • cep Take 1,000 mg by mouth daily. • ondansetron 8 MG Oral Tablet Dispersible Take 8 mg by mouth 3 (three) times daily as needed. 0   • albuterol sulfate (2.5 MG/3ML) 0.083% Inhalation Nebu Soln Take 2.5 mg by nebulization every 4 (four) hours as needed. body mass index is 53.9 kg/m² as calculated from the following:    Height as of this encounter: 64\". Weight as of this encounter: 314 lb (142.4 kg). Vital signs reviewed. Physical Exam:  GEN: She walks with her walker.   She is smiling today and cons successfully lost some weight. Keep up the good work. Continue to use the inhaler regularly. Take the furosemide as needed to help with the puffiness in the legs. She can choose when to take it. Recheck in 6 months.       Meds & Refills for this Visi

## 2020-10-12 RX ORDER — SPIRONOLACTONE 25 MG/1
TABLET ORAL
Qty: 90 TABLET | Refills: 0 | Status: SHIPPED | OUTPATIENT
Start: 2020-10-12 | End: 2021-02-15

## 2020-10-12 RX ORDER — ESTRADIOL 1 MG/1
TABLET ORAL
Qty: 90 TABLET | Refills: 0 | Status: SHIPPED | OUTPATIENT
Start: 2020-10-12 | End: 2021-04-19

## 2020-10-12 NOTE — TELEPHONE ENCOUNTER
Future appt:     Last Appointment with provider:   8/31/2020; Return in about 6 months (around 2/28/2021).     Last appointment at [facility]:  8/31/2020  Cholesterol, Total (mg/dL)   Date Value   06/09/2018 163     HDL Cholesterol (mg/dL)   Date Value

## 2020-10-19 NOTE — TELEPHONE ENCOUNTER
Future appt:     Last Appointment with provider:   8/31/2020; Return in about 6 months (around 2/28/2021).     Last appointment at [facility]:  8/31/2020  Cholesterol, Total (mg/dL)   Date Value   06/09/2018 163     HDL Cholesterol (mg/dL)   Date Value

## 2020-10-22 RX ORDER — MIRTAZAPINE 30 MG/1
TABLET, FILM COATED ORAL
Qty: 90 TABLET | Refills: 0 | Status: SHIPPED | OUTPATIENT
Start: 2020-10-22 | End: 2021-02-22

## 2020-10-30 ENCOUNTER — IMMUNIZATION (OUTPATIENT)
Dept: FAMILY MEDICINE CLINIC | Facility: CLINIC | Age: 55
End: 2020-10-30
Payer: COMMERCIAL

## 2020-10-30 DIAGNOSIS — Z23 NEED FOR VACCINATION: ICD-10-CM

## 2020-10-30 PROCEDURE — 90686 IIV4 VACC NO PRSV 0.5 ML IM: CPT | Performed by: NURSE PRACTITIONER

## 2020-10-30 PROCEDURE — 90471 IMMUNIZATION ADMIN: CPT | Performed by: NURSE PRACTITIONER

## 2020-11-13 RX ORDER — MONTELUKAST SODIUM 10 MG/1
TABLET ORAL
Qty: 90 TABLET | Refills: 1 | Status: SHIPPED | OUTPATIENT
Start: 2020-11-13 | End: 2021-07-31

## 2020-11-13 NOTE — TELEPHONE ENCOUNTER
Montelukast:  1/24/20    Future appt:    Last Appointment with provider:   8/31/2020  Last appointment at [facility]:  8/31/2020  Cholesterol, Total (mg/dL)   Date Value   06/09/2018 163     HDL Cholesterol (mg/dL)   Date Value   06/09/2018 63     LDL Ch

## 2020-11-30 NOTE — TELEPHONE ENCOUNTER
Future appt:     Last Appointment with provider:   8/31/2020; Return in about 6 months (around 2/28/2021).     [facility]:  8/31/2020  Cholesterol, Total (mg/dL)   Date Value   06/09/2018 163     HDL Cholesterol (mg/dL)   Date Value

## 2020-12-04 ENCOUNTER — TELEPHONE (OUTPATIENT)
Dept: FAMILY MEDICINE CLINIC | Facility: CLINIC | Age: 55
End: 2020-12-04

## 2020-12-04 NOTE — TELEPHONE ENCOUNTER
Patient informed she is due for a sleep follow up appointment. Patient is using her CPAP. Scheduled appointment for sleep follow up.     Future Appointments   Date Time Provider Marlene Bronson   12/10/2020  4:00 PM Vijaya Scales APRN [facility] [place] [facility]

## 2020-12-07 RX ORDER — MOMETASONE FUROATE 220 UG/1
INHALANT RESPIRATORY (INHALATION)
Qty: 3 INHALER | Refills: 1 | Status: SHIPPED | OUTPATIENT
Start: 2020-12-07 | End: 2021-07-13

## 2020-12-07 NOTE — TELEPHONE ENCOUNTER
Future appt:     Your appointments     Date & Time Appointment Department [facility])    Dec 10, 2020  4:00 PM CST Sleep Follow Up with Roz Lovell, [address], [place] ([place])            [facility]

## 2020-12-10 ENCOUNTER — OFFICE VISIT (OUTPATIENT)
Dept: FAMILY MEDICINE CLINIC | Facility: CLINIC | Age: 55
End: 2020-12-10
Payer: COMMERCIAL

## 2020-12-10 VITALS
DIASTOLIC BLOOD PRESSURE: 84 MMHG | WEIGHT: 293 LBS | HEART RATE: 100 BPM | HEIGHT: 64 IN | TEMPERATURE: 98 F | RESPIRATION RATE: 16 BRPM | BODY MASS INDEX: 50.02 KG/M2 | SYSTOLIC BLOOD PRESSURE: 124 MMHG | OXYGEN SATURATION: 96 %

## 2020-12-10 DIAGNOSIS — R30.0 DYSURIA: Primary | ICD-10-CM

## 2020-12-10 DIAGNOSIS — N30.01 ACUTE CYSTITIS WITH HEMATURIA: ICD-10-CM

## 2020-12-10 PROBLEM — M15.9 PRIMARY OSTEOARTHRITIS INVOLVING MULTIPLE JOINTS: Status: ACTIVE | Noted: 2020-11-18

## 2020-12-10 PROCEDURE — 81001 URINALYSIS AUTO W/SCOPE: CPT | Performed by: NURSE PRACTITIONER

## 2020-12-10 PROCEDURE — 3008F BODY MASS INDEX DOCD: CPT | Performed by: NURSE PRACTITIONER

## 2020-12-10 PROCEDURE — 3079F DIAST BP 80-89 MM HG: CPT | Performed by: NURSE PRACTITIONER

## 2020-12-10 PROCEDURE — 81003 URINALYSIS AUTO W/O SCOPE: CPT | Performed by: NURSE PRACTITIONER

## 2020-12-10 PROCEDURE — 87086 URINE CULTURE/COLONY COUNT: CPT | Performed by: NURSE PRACTITIONER

## 2020-12-10 PROCEDURE — 99214 OFFICE O/P EST MOD 30 MIN: CPT | Performed by: NURSE PRACTITIONER

## 2020-12-10 PROCEDURE — 3074F SYST BP LT 130 MM HG: CPT | Performed by: NURSE PRACTITIONER

## 2020-12-10 RX ORDER — PROPRANOLOL HYDROCHLORIDE 10 MG/1
10 TABLET ORAL 2 TIMES DAILY
COMMUNITY
Start: 2020-09-16 | End: 2021-06-15

## 2020-12-10 RX ORDER — PHENAZOPYRIDINE HYDROCHLORIDE 100 MG/1
100 TABLET, FILM COATED ORAL 3 TIMES DAILY PRN
Qty: 20 TABLET | Refills: 0 | Status: SHIPPED | OUTPATIENT
Start: 2020-12-10 | End: 2021-08-27

## 2020-12-10 RX ORDER — CEPHALEXIN 500 MG/1
500 CAPSULE ORAL 3 TIMES DAILY
Qty: 21 CAPSULE | Refills: 0 | Status: SHIPPED | OUTPATIENT
Start: 2020-12-10 | End: 2020-12-17

## 2020-12-10 NOTE — PATIENT INSTRUCTIONS
Urine micro and  culture pending. Take pyridium for the pain and urgency. Continue to push fluids. Encouraged to eat yogurt or take probiotic daily while on antibiotic for prevention of a yeast infection.   Cranberry juice may alter the pH and may be he

## 2020-12-10 NOTE — PROGRESS NOTES
HPI:    Patient ID: Praveen Trinidad is a 60year old female. HPI     Patient is present with her  originally for sleep therapy, but states that she is more concerned about burning with urination that has been going on for the past 3 - 4 days.  Stat tablet 1   • ATOMOXETINE HCL 60 MG Oral Cap TAKE 1 CAPSULE DAILY 90 capsule 1   • XELJANZ XR 11 MG Oral Tablet 24 Hr      • TRAMADOL HCL 50 MG Oral Tab TAKE 1 TABLET BY MOUTH EVERY 4 HOURS AS NEEDED FOR PAIN 30 tablet 0   • Dicyclomine HCl 20 MG Oral Tab T well-developed and well-nourished. No distress. HENT:   Head: Normocephalic and atraumatic. Right Ear: External ear normal.   Left Ear: External ear normal.   Mal 4, tonsils 0   Eyes: Conjunctivae are normal.   Neck: Normal range of motion.  Neck supple

## 2020-12-11 ENCOUNTER — VIRTUAL PHONE E/M (OUTPATIENT)
Dept: FAMILY MEDICINE CLINIC | Facility: CLINIC | Age: 55
End: 2020-12-11
Payer: COMMERCIAL

## 2020-12-11 DIAGNOSIS — G47.33 OBSTRUCTIVE SLEEP APNEA SYNDROME: Primary | ICD-10-CM

## 2020-12-11 PROCEDURE — 99212 OFFICE O/P EST SF 10 MIN: CPT | Performed by: NURSE PRACTITIONER

## 2020-12-11 NOTE — PROGRESS NOTES
[facility]  SLE[facility]        HPI:   This is a 60year old female coming in for Patient presents with:  Obstructive Sleep Apnea (FELISA)      HPI:     Phone visit done for patient for sleep therapy follow-up.   Was able to download arthritis ([facility] [address] 2016   • Sleep apnea    • Vitamin D deficiency 2009    per.  Dr. Tatum Ballesteros      Past Surgical History:   Procedure Laterality Date   • APPENDECTOMY     •      • HYSTERECTOMY       Social History:  Social History    Social H Oral Tablet 24 Hr TAKE 1 TABLET TWICE A  tablet 1   • ATORVASTATIN 20 MG Oral Tab TAKE 1 TABLET NIGHTLY 90 tablet 1   • ATOMOXETINE HCL 60 MG Oral Cap TAKE 1 CAPSULE DAILY 90 capsule 1   • XELJANZ XR 11 MG Oral Tablet 24 Hr      • TRAMADOL HCL 50 MG states     Class 3 severe obesity due to excess calories with serious comorbidity and body mass index (BMI) of 50.0 to 59.9 in adult [facility])     Osteoarthrosis     Sciatica     Obstructive sleep apnea syndrome     S/P CHRISTINA-BSO (total abdominal hysterectomy an Obstructive sleep apnea syndrome    Patient Instructions   Continue sleep therapy. Follow-up in 6 months - sooner if needed.      Advised if still with sleep apnea and not using CPAP has a  7 fold increase in risk of heart attack, stroke, abnormal heart r Vijaya Scales, DEBRA  12/11/2020  2:09 PM

## 2020-12-14 ENCOUNTER — TELEPHONE (OUTPATIENT)
Dept: FAMILY MEDICINE CLINIC | Facility: CLINIC | Age: 55
End: 2020-12-14

## 2020-12-14 NOTE — TELEPHONE ENCOUNTER
----- Message from KingnetDEBRA sent at 12/14/2020  8:53 AM CST -----  Urine culture is negative. Return to clinic if still having symptoms. Note to MyChart.

## 2021-01-23 NOTE — TELEPHONE ENCOUNTER
Automoxitine:  Buproprion:  Atorvastatin:   8/31/20 for all    Future appt:     Your appointments     Date & Time Appointment Department [facility])    Feb 15, 2021  3:30 PM CST Follow up Visit with Tigre Friedman MD [address] [place] [address]

## 2021-01-25 RX ORDER — BUPROPION HYDROCHLORIDE 150 MG/1
TABLET ORAL
Qty: 180 TABLET | Refills: 1 | Status: SHIPPED | OUTPATIENT
Start: 2021-01-25 | End: 2021-06-15

## 2021-01-25 RX ORDER — ATORVASTATIN CALCIUM 20 MG/1
TABLET, FILM COATED ORAL
Qty: 90 TABLET | Refills: 1 | Status: SHIPPED | OUTPATIENT
Start: 2021-01-25 | End: 2021-06-28

## 2021-01-25 RX ORDER — ATOMOXETINE 60 MG/1
CAPSULE ORAL
Qty: 90 CAPSULE | Refills: 1 | Status: SHIPPED | OUTPATIENT
Start: 2021-01-25 | End: 2021-06-28

## 2021-02-05 NOTE — TELEPHONE ENCOUNTER
Future appt:     Your appointments     Date & Time Appointment Department [facility])    Feb 15, 2021  3:30 PM CST Follow up Visit with Neal Ibarra MD [address] [place] [address], [facility] ([place])    Contact your primary c

## 2021-02-08 RX ORDER — ALBUTEROL SULFATE 90 UG/1
2 AEROSOL, METERED RESPIRATORY (INHALATION) EVERY 4 HOURS PRN
Qty: 3 INHALER | Refills: 1 | Status: SHIPPED | OUTPATIENT
Start: 2021-02-08

## 2021-02-15 ENCOUNTER — OFFICE VISIT (OUTPATIENT)
Dept: FAMILY MEDICINE CLINIC | Facility: CLINIC | Age: 56
End: 2021-02-15
Payer: COMMERCIAL

## 2021-02-15 VITALS
BODY MASS INDEX: 50.02 KG/M2 | RESPIRATION RATE: 18 BRPM | HEART RATE: 100 BPM | WEIGHT: 293 LBS | DIASTOLIC BLOOD PRESSURE: 92 MMHG | OXYGEN SATURATION: 97 % | TEMPERATURE: 98 F | HEIGHT: 64 IN | SYSTOLIC BLOOD PRESSURE: 152 MMHG

## 2021-02-15 DIAGNOSIS — R00.0 SINUS TACHYCARDIA: ICD-10-CM

## 2021-02-15 DIAGNOSIS — E78.49 FAMILIAL MULTIPLE LIPOPROTEIN-TYPE HYPERLIPIDEMIA: ICD-10-CM

## 2021-02-15 DIAGNOSIS — R60.1 GENERALIZED EDEMA: ICD-10-CM

## 2021-02-15 DIAGNOSIS — J45.40 MODERATE PERSISTENT ASTHMA WITHOUT COMPLICATION: Primary | ICD-10-CM

## 2021-02-15 DIAGNOSIS — I83.93 ASYMPTOMATIC VARICOSE VEINS OF BOTH LOWER EXTREMITIES: ICD-10-CM

## 2021-02-15 DIAGNOSIS — M05.79 RHEUMATOID ARTHRITIS INVOLVING MULTIPLE SITES WITH POSITIVE RHEUMATOID FACTOR (HCC): ICD-10-CM

## 2021-02-15 PROBLEM — A41.9 SEPSIS (HCC): Status: RESOLVED | Noted: 2019-02-03 | Resolved: 2021-02-15

## 2021-02-15 PROCEDURE — 99214 OFFICE O/P EST MOD 30 MIN: CPT | Performed by: FAMILY MEDICINE

## 2021-02-15 PROCEDURE — 3080F DIAST BP >= 90 MM HG: CPT | Performed by: FAMILY MEDICINE

## 2021-02-15 PROCEDURE — 3008F BODY MASS INDEX DOCD: CPT | Performed by: FAMILY MEDICINE

## 2021-02-15 PROCEDURE — 3077F SYST BP >= 140 MM HG: CPT | Performed by: FAMILY MEDICINE

## 2021-02-15 RX ORDER — SPIRONOLACTONE 50 MG/1
50 TABLET, FILM COATED ORAL DAILY
Refills: 0 | COMMUNITY
Start: 2021-02-15

## 2021-02-15 NOTE — PATIENT INSTRUCTIONS
Please wear support stockings every day. Recommend taking a small amount of Miralax daily to keep stools regular.

## 2021-02-15 NOTE — PROGRESS NOTES
[address]  PROGRESS NOTE  Chief Complaint:   Patient presents with: Follow - Up  Asthma  Edema      HPI:   This is a 60year old female coming in for follow-up on multiple medical issues.     First, she said that her asthma has been [place] Yellow Yellow    Clarity Urine Cloudy (A) Clear    Spec Gravity 1.019 1.001 - 1.030    Glucose Urine Negative Negative mg/dl    Bilirubin Urine Negative Negative    Ketones Urine Negative Negative mg/dL    Blood Urine Small (A) Negative    pH Urine 5.0 4.5 Adhesive Tape           RASH  Latex                   RASH  Levofloxacin In D5w     RASH    Comment:Developed rash & phlebitis  Current Meds:  Current Outpatient Medications   Medication Sig Dispense Refill   • spironolactone 50 MG Oral Tab Take 1 tabl • ClonazePAM 1 MG Oral Tab Take 1 tablet (1 mg total) by mouth 3 (three) times daily as needed for Anxiety. 30 tablet 0   • Cholecalciferol (VITAMIN D) 1000 units Oral Tab Take 1,000 mg by mouth daily.      • ondansetron 8 MG Oral Tablet Dispersible Take sweating, cold or heat intolerance, polyuria or polydipsia. ALLERGIES:  Denies allergic response, history of asthma, sneezing, hives, eczema or rhinitis.      EXAM:   BP (!) 152/92   Pulse 100   Temp 97.7 °F (36.5 °C) (Temporal)   Resp 18   Ht 5' 4\" (1.62 now.    4. Asymptomatic varicose veins of both lower extremities  She does have varicose veins in both lower extremities. That may be part of the reason for the brawny edema.     5. Rheumatoid arthritis involving multiple sites with positive rheumatoid fac Sciatica     Obstructive sleep apnea syndrome     S/P CHRISTINA-BSO (total abdominal hysterectomy and bilateral salpingo-oophorectomy)     Vitamin D deficiency     Benign paroxysmal positional vertigo     Rheumatoid arthritis ([facility] [address]     Fluid retention     Asympt

## 2021-02-22 RX ORDER — MIRTAZAPINE 30 MG/1
TABLET, FILM COATED ORAL
Qty: 90 TABLET | Refills: 1 | Status: SHIPPED | OUTPATIENT
Start: 2021-02-22 | End: 2021-08-30

## 2021-02-22 NOTE — TELEPHONE ENCOUNTER
Future appt:     Last Appointment with provider:  2/15/2021; Return in about 4 months (around 6/15/2021).     Last appointment at [facility]:  2/15/2021  Cholesterol, Total (mg/dL)   Date Value   06/09/2018 163     HDL Cholesterol (mg/dL)   Date Value   0

## 2021-03-02 ENCOUNTER — TELEPHONE (OUTPATIENT)
Dept: FAMILY MEDICINE CLINIC | Facility: CLINIC | Age: 56
End: 2021-03-02

## 2021-03-02 ENCOUNTER — OFFICE VISIT (OUTPATIENT)
Dept: FAMILY MEDICINE CLINIC | Facility: CLINIC | Age: 56
End: 2021-03-02
Payer: COMMERCIAL

## 2021-03-02 VITALS
TEMPERATURE: 97 F | DIASTOLIC BLOOD PRESSURE: 84 MMHG | SYSTOLIC BLOOD PRESSURE: 136 MMHG | OXYGEN SATURATION: 96 % | WEIGHT: 293 LBS | HEIGHT: 64 IN | BODY MASS INDEX: 50.02 KG/M2 | RESPIRATION RATE: 18 BRPM | HEART RATE: 101 BPM

## 2021-03-02 DIAGNOSIS — R31.0 GROSS HEMATURIA: Primary | ICD-10-CM

## 2021-03-02 DIAGNOSIS — R10.9 RIGHT FLANK PAIN: ICD-10-CM

## 2021-03-02 LAB
APPEARANCE: CLEAR
BILIRUB UR QL STRIP.AUTO: NEGATIVE
BILIRUBIN: NEGATIVE
COLOR UR AUTO: YELLOW
GLUCOSE (URINE DIPSTICK): NEGATIVE MG/DL
GLUCOSE UR STRIP.AUTO-MCNC: NEGATIVE MG/DL
KETONES (URINE DIPSTICK): NEGATIVE MG/DL
KETONES UR STRIP.AUTO-MCNC: NEGATIVE MG/DL
MULTISTIX LOT#: 1044 NUMERIC
NITRITE UR QL STRIP.AUTO: NEGATIVE
NITRITE, URINE: NEGATIVE
PH UR STRIP.AUTO: 5 [PH] (ref 5–8)
PH, URINE: 5.5 (ref 4.5–8)
PROT UR STRIP.AUTO-MCNC: 30 MG/DL
PROTEIN (URINE DIPSTICK): 30 MG/DL
SP GR UR STRIP.AUTO: 1.01 (ref 1–1.03)
SPECIFIC GRAVITY: 1.02 (ref 1–1.03)
URINE-COLOR: YELLOW
UROBILINOGEN UR STRIP.AUTO-MCNC: <2 MG/DL
UROBILINOGEN,SEMI-QN: 0.2 MG/DL (ref 0–1.9)
WBC #/AREA URNS AUTO: >50 /HPF
WBC CLUMPS UR QL AUTO: PRESENT /HPF

## 2021-03-02 PROCEDURE — 99214 OFFICE O/P EST MOD 30 MIN: CPT | Performed by: NURSE PRACTITIONER

## 2021-03-02 PROCEDURE — 87086 URINE CULTURE/COLONY COUNT: CPT | Performed by: NURSE PRACTITIONER

## 2021-03-02 PROCEDURE — 81001 URINALYSIS AUTO W/SCOPE: CPT | Performed by: NURSE PRACTITIONER

## 2021-03-02 PROCEDURE — 3079F DIAST BP 80-89 MM HG: CPT | Performed by: NURSE PRACTITIONER

## 2021-03-02 PROCEDURE — 3075F SYST BP GE 130 - 139MM HG: CPT | Performed by: NURSE PRACTITIONER

## 2021-03-02 PROCEDURE — 81003 URINALYSIS AUTO W/O SCOPE: CPT | Performed by: NURSE PRACTITIONER

## 2021-03-02 PROCEDURE — 3008F BODY MASS INDEX DOCD: CPT | Performed by: NURSE PRACTITIONER

## 2021-03-02 RX ORDER — CEPHALEXIN 500 MG/1
500 CAPSULE ORAL 3 TIMES DAILY
Qty: 30 CAPSULE | Refills: 0 | Status: SHIPPED | OUTPATIENT
Start: 2021-03-02 | End: 2021-03-12

## 2021-03-02 NOTE — TELEPHONE ENCOUNTER
Patient states symptoms started 3 days ago dysuria. Lower abdominal pain and blood in urine. Kept in office appt. No other positive travel screen questions.

## 2021-03-02 NOTE — TELEPHONE ENCOUNTER
Future Appointments   Date Time Provider Marlene Bronson   3/2/2021  3:30 PM Wing Scales APRN [facility] [place] [facility] [place]     + TS questions - abd pain & bleeding. Having blood in urine & feels abd pain is due to bladder.     Is it OK for her to come

## 2021-03-02 NOTE — PROGRESS NOTES
HPI:   Patient presents with:  UTI: blood in urine, bladder pain       Freida Zavala is a 60year old female who complains of burning with urination, dysuria, right flank pain, frequency, hematuria and urgency. She has had symptoms for 4 days.    She als 3/2/2021, 3:38 PM

## 2021-03-02 NOTE — PATIENT INSTRUCTIONS
Urine culture pending. Take pyridium as needed for the pain. Continue to push fluids. Encouraged to eat yogurt or take probiotic daily while on antibiotic for prevention of a yeast infection.   Cranberry juice may alter the pH and may be helpful to prev

## 2021-03-03 ENCOUNTER — TELEPHONE (OUTPATIENT)
Dept: FAMILY MEDICINE CLINIC | Facility: CLINIC | Age: 56
End: 2021-03-03

## 2021-03-04 ENCOUNTER — TELEPHONE (OUTPATIENT)
Dept: FAMILY MEDICINE CLINIC | Facility: CLINIC | Age: 56
End: 2021-03-04

## 2021-03-04 NOTE — TELEPHONE ENCOUNTER
----- Message from DEBRA Funes sent at 3/4/2021 11:13 AM CST -----  Fay Alonzochela is out of the office today. Results have been reviewed. Urine culture is negative.   Please let patient know she may continue to take antibiotic if she feels like it is help

## 2021-03-10 ENCOUNTER — TELEPHONE (OUTPATIENT)
Dept: FAMILY MEDICINE CLINIC | Facility: CLINIC | Age: 56
End: 2021-03-10

## 2021-03-10 ENCOUNTER — HOSPITAL ENCOUNTER (OUTPATIENT)
Dept: ULTRASOUND IMAGING | Age: 56
Discharge: HOME | End: 2021-03-10
Attending: NURSE PRACTITIONER
Payer: COMMERCIAL

## 2021-03-10 DIAGNOSIS — R10.9 RIGHT FLANK PAIN: ICD-10-CM

## 2021-03-10 DIAGNOSIS — R31.0 GROSS HEMATURIA: ICD-10-CM

## 2021-03-10 PROCEDURE — 76770 US EXAM ABDO BACK WALL COMP: CPT | Performed by: NURSE PRACTITIONER

## 2021-03-10 NOTE — TELEPHONE ENCOUNTER
----- Message from DEBRA Lo sent at 3/10/2021  2:19 PM CST -----  Kidney ultrasound is normal, no stones seen. If patient is still having symptoms, recommend CT scan. Note to MyChart.

## 2021-03-12 ENCOUNTER — TELEPHONE (OUTPATIENT)
Dept: FAMILY MEDICINE CLINIC | Facility: CLINIC | Age: 56
End: 2021-03-12

## 2021-03-12 DIAGNOSIS — R31.0 GROSS HEMATURIA: ICD-10-CM

## 2021-03-12 DIAGNOSIS — R10.9 RIGHT FLANK PAIN: Primary | ICD-10-CM

## 2021-03-12 NOTE — TELEPHONE ENCOUNTER
Order done for CT of abdomen and pelvis with and without contrast. Please fax order to [facility] and let patient know. Thank you.

## 2021-03-12 NOTE — TELEPHONE ENCOUNTER
Would like to have CT Scan Also - Please put order in chart and fax order to [facility]

## 2021-03-19 ENCOUNTER — TELEPHONE (OUTPATIENT)
Dept: FAMILY MEDICINE CLINIC | Facility: CLINIC | Age: 56
End: 2021-03-19

## 2021-03-19 DIAGNOSIS — R31.0 GROSS HEMATURIA: ICD-10-CM

## 2021-03-19 DIAGNOSIS — R10.9 RIGHT FLANK PAIN: Primary | ICD-10-CM

## 2021-03-19 NOTE — TELEPHONE ENCOUNTER
Vijaya out of the office. CT of the abdomen and pelvis with and without oral and IV contrast from [facility] received. No hydronephrosis, renal mass or specific findings to account for patient's hematuria.   If ongoing radiologist recommends urolo

## 2021-03-19 NOTE — TELEPHONE ENCOUNTER
Patient notified of these results and the recommendation for uroscopy if blood in urine continues. Patient has no questions at this time.

## 2021-03-23 ENCOUNTER — PATIENT MESSAGE (OUTPATIENT)
Dept: FAMILY MEDICINE CLINIC | Facility: CLINIC | Age: 56
End: 2021-03-23

## 2021-03-24 NOTE — TELEPHONE ENCOUNTER
From: Carlitos Wisdom  To: Biibana Rod MD  Sent: 3/23/2021 5:20 PM CDT  Subject: Test Results Question    I have a question about CT resulted on 3/23/21 at 3:16 PM.    It only shows a gray page. Can it be released again?

## 2021-04-19 RX ORDER — ESTRADIOL 1 MG/1
TABLET ORAL
Qty: 90 TABLET | Refills: 1 | Status: SHIPPED | OUTPATIENT
Start: 2021-04-19 | End: 2021-07-13

## 2021-04-19 NOTE — TELEPHONE ENCOUNTER
I missed that patient saw Dr. Alyce Serra in February was was recommended to f/u in June. Please advise RF.

## 2021-04-19 NOTE — TELEPHONE ENCOUNTER
Future appt:     Last Appointment with provider:  8/31/2020; Return in about 6 months (around 2/28/2021).     Last appointment at [facility]:  3/2/2021  Cholesterol, Total (mg/dL)   Date Value   06/09/2018 163     HDL Cholesterol (mg/dL)   Date Value   06

## 2021-05-28 NOTE — TELEPHONE ENCOUNTER
Future appt:     Your appointments     Date & Time Appointment Department [facility])    Tray 15, 2021  4:00 PM CDT Follow up Visit with Suman Calderon MD [address] [place] [address] ([facility])    Contact your primary c

## 2021-06-15 ENCOUNTER — OFFICE VISIT (OUTPATIENT)
Dept: FAMILY MEDICINE CLINIC | Facility: CLINIC | Age: 56
End: 2021-06-15
Payer: MEDICARE

## 2021-06-15 VITALS
OXYGEN SATURATION: 99 % | HEIGHT: 64 IN | BODY MASS INDEX: 50.02 KG/M2 | SYSTOLIC BLOOD PRESSURE: 134 MMHG | TEMPERATURE: 98 F | RESPIRATION RATE: 16 BRPM | HEART RATE: 100 BPM | WEIGHT: 293 LBS | DIASTOLIC BLOOD PRESSURE: 78 MMHG

## 2021-06-15 DIAGNOSIS — Z00.00 ENCOUNTER FOR ANNUAL HEALTH EXAMINATION: Primary | ICD-10-CM

## 2021-06-15 DIAGNOSIS — R60.9 FLUID RETENTION: ICD-10-CM

## 2021-06-15 DIAGNOSIS — G89.29 CHRONIC BILATERAL LOW BACK PAIN WITH RIGHT-SIDED SCIATICA: ICD-10-CM

## 2021-06-15 DIAGNOSIS — J45.40 MODERATE PERSISTENT ASTHMA WITHOUT COMPLICATION: ICD-10-CM

## 2021-06-15 DIAGNOSIS — F33.1 MODERATE EPISODE OF RECURRENT MAJOR DEPRESSIVE DISORDER (HCC): ICD-10-CM

## 2021-06-15 DIAGNOSIS — N95.1 SYMPTOMATIC MENOPAUSAL OR FEMALE CLIMACTERIC STATES: ICD-10-CM

## 2021-06-15 DIAGNOSIS — G47.33 OBSTRUCTIVE SLEEP APNEA SYNDROME: ICD-10-CM

## 2021-06-15 DIAGNOSIS — I83.93 ASYMPTOMATIC VARICOSE VEINS OF BOTH LOWER EXTREMITIES: ICD-10-CM

## 2021-06-15 DIAGNOSIS — Z90.722 S/P TAH-BSO (TOTAL ABDOMINAL HYSTERECTOMY AND BILATERAL SALPINGO-OOPHORECTOMY): ICD-10-CM

## 2021-06-15 DIAGNOSIS — J30.1 SEASONAL ALLERGIC RHINITIS DUE TO POLLEN: ICD-10-CM

## 2021-06-15 DIAGNOSIS — R60.1 GENERALIZED EDEMA: ICD-10-CM

## 2021-06-15 DIAGNOSIS — E66.01 CLASS 3 SEVERE OBESITY DUE TO EXCESS CALORIES WITH SERIOUS COMORBIDITY AND BODY MASS INDEX (BMI) OF 50.0 TO 59.9 IN ADULT (HCC): ICD-10-CM

## 2021-06-15 DIAGNOSIS — H81.13 BENIGN PAROXYSMAL POSITIONAL VERTIGO DUE TO BILATERAL VESTIBULAR DISORDER: ICD-10-CM

## 2021-06-15 DIAGNOSIS — M05.79 RHEUMATOID ARTHRITIS INVOLVING MULTIPLE SITES WITH POSITIVE RHEUMATOID FACTOR (HCC): ICD-10-CM

## 2021-06-15 DIAGNOSIS — M54.41 CHRONIC BILATERAL LOW BACK PAIN WITH RIGHT-SIDED SCIATICA: ICD-10-CM

## 2021-06-15 DIAGNOSIS — E55.9 VITAMIN D DEFICIENCY: ICD-10-CM

## 2021-06-15 DIAGNOSIS — R79.89 ELEVATED LFTS: ICD-10-CM

## 2021-06-15 DIAGNOSIS — Z90.710 S/P TAH-BSO (TOTAL ABDOMINAL HYSTERECTOMY AND BILATERAL SALPINGO-OOPHORECTOMY): ICD-10-CM

## 2021-06-15 DIAGNOSIS — Z90.79 S/P TAH-BSO (TOTAL ABDOMINAL HYSTERECTOMY AND BILATERAL SALPINGO-OOPHORECTOMY): ICD-10-CM

## 2021-06-15 DIAGNOSIS — M15.9 PRIMARY OSTEOARTHRITIS INVOLVING MULTIPLE JOINTS: ICD-10-CM

## 2021-06-15 DIAGNOSIS — E78.49 FAMILIAL MULTIPLE LIPOPROTEIN-TYPE HYPERLIPIDEMIA: ICD-10-CM

## 2021-06-15 PROBLEM — R00.0 SINUS TACHYCARDIA: Status: RESOLVED | Noted: 2020-01-08 | Resolved: 2021-06-15

## 2021-06-15 PROBLEM — K52.9 GASTROENTERITIS: Status: RESOLVED | Noted: 2020-02-04 | Resolved: 2021-06-15

## 2021-06-15 PROBLEM — E87.6 HYPOKALEMIA: Status: RESOLVED | Noted: 2020-01-04 | Resolved: 2021-06-15

## 2021-06-15 PROBLEM — R10.9 ABDOMINAL PAIN: Status: RESOLVED | Noted: 2020-03-02 | Resolved: 2021-06-15

## 2021-06-15 PROBLEM — K52.9 COLITIS: Status: RESOLVED | Noted: 2019-12-31 | Resolved: 2021-06-15

## 2021-06-15 PROBLEM — R07.2 PRECORDIAL PAIN: Status: RESOLVED | Noted: 2020-01-08 | Resolved: 2021-06-15

## 2021-06-15 PROCEDURE — 99213 OFFICE O/P EST LOW 20 MIN: CPT | Performed by: FAMILY MEDICINE

## 2021-06-15 PROCEDURE — G0439 PPPS, SUBSEQ VISIT: HCPCS | Performed by: FAMILY MEDICINE

## 2021-06-15 RX ORDER — DULOXETIN HYDROCHLORIDE 30 MG/1
30 CAPSULE, DELAYED RELEASE ORAL DAILY
Qty: 30 CAPSULE | Refills: 5 | Status: SHIPPED | OUTPATIENT
Start: 2021-06-15 | End: 2021-12-07

## 2021-06-15 RX ORDER — OXYBUTYNIN CHLORIDE 10 MG/1
10 TABLET, EXTENDED RELEASE ORAL DAILY
COMMUNITY
Start: 2021-03-30 | End: 2021-12-29 | Stop reason: ALTCHOICE

## 2021-06-15 RX ORDER — ASPIRIN 81 MG/1
81 TABLET ORAL ONCE AS NEEDED
COMMUNITY
Start: 2021-06-13

## 2021-06-15 NOTE — PROGRESS NOTES
REASON FOR VISIT:    Jacklyn Gil is a 60year old female who presents for a Medicare Annual Wellness visit. She is very discouraged. She feels like every day is the same. She has pain and gets very tired with any exertion.   She has been feeling dep salpingo-oophorectomy)     Vitamin D deficiency     Benign paroxysmal positional vertigo     Rheumatoid arthritis (HCC)     Fluid retention     Asymptomatic varicose veins of both lower extremities     Primary osteoarthritis involving multiple joints     E Place 1 drop into both eyes 3 (three) times daily as needed. • omega-3 fatty acids 1000 MG Oral Cap Take 1,000 mg by mouth daily. • Fluticasone Propionate 50 MCG/ACT Nasal Suspension 2 sprays by Nasal route daily.  3 Bottle 3   • ClonazePAM 1 MG Ora Fall/Risk Assessment                 Depression Screening (PHQ-2/PHQ-9): Over the LAST 2 WEEKS         PHQ-9 Depression Screen     1. Little interest or pleasure in doing things: Several days  2. Feeling down, depressed, or hopeless: Several days  3. rash & phlebitis  MEDICAL INFORMATION:   Past Medical History:   Diagnosis Date   • Allergic rhinitis    • Anxiety    • Arthritis    • Asthma    • Depression    • Esophageal reflux    • Familial multiple lipoprotein-type hyperlipidemia 12/3/2010   • Hyperl headaches  PSYCHE: She has a longstanding history of depression. HEMATOLOGIC: denies hx of anemia  ENDOCRINE: She has had hot flashes in the past.  ALL/ASTHMA: She has a longstanding history of asthma and seasonal allergies.   Her symptoms are significant VISIT,EST,LEVL III    3. Moderate episode of recurrent major depressive disorder (HCC)  Her depression is not well controlled now. Plan: Discontinue bupropion. Start Cymbalta 30 mg p.o. daily. Recheck in 4 weeks.   Start counseling.  - OP REFERRAL TO [facility] to bilateral vestibular disorder  Improved. She does not need any treatment for this now.  - OFFICE/OUTPT VISIT,CHERRY MARIN III    15.  Class 3 severe obesity due to excess calories with serious comorbidity and body mass index (BMI) of 50.0 to 59.9 in adult (

## 2021-06-15 NOTE — PATIENT INSTRUCTIONS
Stop taking Buproprion. Start Cymbalta 30 mg daily. Recheck in 3-4 weeks. 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 Wean off Estrogen; take it every other day for 2 weeks, then stop taking it.         Recommended Websites for Advanced Directives    [email or web address]

## 2021-06-28 RX ORDER — ATORVASTATIN CALCIUM 20 MG/1
TABLET, FILM COATED ORAL
Qty: 90 TABLET | Refills: 1 | Status: SHIPPED | OUTPATIENT
Start: 2021-06-28 | End: 2021-11-20

## 2021-06-28 RX ORDER — ATOMOXETINE 60 MG/1
CAPSULE ORAL
Qty: 90 CAPSULE | Refills: 1 | Status: SHIPPED | OUTPATIENT
Start: 2021-06-28 | End: 2021-11-20

## 2021-06-28 NOTE — TELEPHONE ENCOUNTER
Future appt:     Your appointments     Date & Time Appointment Department [facility])    Jul 13, 2021  3:30 PM CDT Follow up - Extended with Casey Wright MD [address] [facility] [place] ([facility]

## 2021-07-13 ENCOUNTER — OFFICE VISIT (OUTPATIENT)
Dept: FAMILY MEDICINE CLINIC | Facility: CLINIC | Age: 56
End: 2021-07-13
Payer: MEDICARE

## 2021-07-13 VITALS
TEMPERATURE: 97 F | OXYGEN SATURATION: 96 % | HEIGHT: 64 IN | DIASTOLIC BLOOD PRESSURE: 78 MMHG | BODY MASS INDEX: 50.02 KG/M2 | RESPIRATION RATE: 16 BRPM | HEART RATE: 112 BPM | SYSTOLIC BLOOD PRESSURE: 134 MMHG | WEIGHT: 293 LBS

## 2021-07-13 DIAGNOSIS — M05.79 RHEUMATOID ARTHRITIS INVOLVING MULTIPLE SITES WITH POSITIVE RHEUMATOID FACTOR (HCC): ICD-10-CM

## 2021-07-13 DIAGNOSIS — R60.9 FLUID RETENTION: ICD-10-CM

## 2021-07-13 DIAGNOSIS — F33.1 MODERATE EPISODE OF RECURRENT MAJOR DEPRESSIVE DISORDER (HCC): ICD-10-CM

## 2021-07-13 DIAGNOSIS — J45.40 MODERATE PERSISTENT ASTHMA WITHOUT COMPLICATION: Primary | ICD-10-CM

## 2021-07-13 PROCEDURE — 99214 OFFICE O/P EST MOD 30 MIN: CPT | Performed by: FAMILY MEDICINE

## 2021-07-13 RX ORDER — ONDANSETRON 4 MG/1
4 TABLET, FILM COATED ORAL EVERY 8 HOURS PRN
Qty: 20 TABLET | Refills: 2 | Status: SHIPPED | OUTPATIENT
Start: 2021-07-13

## 2021-07-13 NOTE — PATIENT INSTRUCTIONS
Stop taking Estrogen. Start using Breo Ellipta one puff daily. Recheck in 1 month.   Schedule a mammogram at [facility].

## 2021-07-13 NOTE — PROGRESS NOTES
[address]  PROGRESS NOTE  Chief Complaint:   Patient presents with: Follow - Up: Was in the hoptal on July 5th and 6th      HPI:   This is a 60year old female coming in for follow-up on the change of her depression medication.   She mg/dL    Nitrite Urine Negative Negative    Leukocyte Esterase Urine Large (A) Negative    WBC Urine >50 (A) 0 - 5 /HPF    RBC Urine 6-10 (A) 0 - 2 /HPF    Bacteria Urine Rare (A) None Seen /HPF    Squamous Epi.  Cells Few (A) None Seen /HPF    Renal Tubula Refill   • Fluticasone Furoate-Vilanterol (BREO ELLIPTA) 200-25 MCG/INH Inhalation Aerosol Powder, Breath Activated Inhale 1 puff into the lungs daily.  60 each 5   • Ondansetron HCl (ZOFRAN) 4 mg tablet Take 1 tablet (4 mg total) by mouth every 8 (eight) h sprays by Nasal route daily. 3 Bottle 3   • ClonazePAM 1 MG Oral Tab Take 1 tablet (1 mg total) by mouth 3 (three) times daily as needed for Anxiety. 30 tablet 0   • Cholecalciferol (VITAMIN D) 1000 units Oral Tab Take 1,000 mg by mouth daily.      • devora (BP Location: Left arm, Patient Position: Sitting, Cuff Size: thigh)   Pulse 112   Temp 97.1 °F (36.2 °C) (Temporal)   Resp 16   Ht 5' 4\" (1.626 m)   Wt (!) 336 lb 3.2 oz (152.5 kg)   SpO2 96%   BMI 57.71 kg/m²  Estimated body mass index is 57.71 kg/m² as Switch to Breo Ellipta 1 puff daily. Continue Cymbalta. No new antibiotics now. Return in 1 month.       Meds & Refills for this Visit:  Requested Prescriptions     Signed Prescriptions Disp Refills   • Fluticasone Furoate-Vilanterol (BREO ELLIPTA) 200-2

## 2021-07-30 ENCOUNTER — OFFICE VISIT (OUTPATIENT)
Dept: FAMILY MEDICINE CLINIC | Facility: CLINIC | Age: 56
End: 2021-07-30
Payer: MEDICARE

## 2021-07-30 VITALS
HEIGHT: 64 IN | DIASTOLIC BLOOD PRESSURE: 80 MMHG | SYSTOLIC BLOOD PRESSURE: 136 MMHG | HEART RATE: 98 BPM | RESPIRATION RATE: 18 BRPM | BODY MASS INDEX: 50.02 KG/M2 | WEIGHT: 293 LBS | TEMPERATURE: 97 F | OXYGEN SATURATION: 98 %

## 2021-07-30 DIAGNOSIS — R35.0 URINARY FREQUENCY: Primary | ICD-10-CM

## 2021-07-30 DIAGNOSIS — N30.90 CYSTITIS: ICD-10-CM

## 2021-07-30 DIAGNOSIS — R30.0 DYSURIA: ICD-10-CM

## 2021-07-30 DIAGNOSIS — R30.0 BURNING WITH URINATION: ICD-10-CM

## 2021-07-30 PROBLEM — K52.9 ENTEROCOLITIS: Status: ACTIVE | Noted: 2021-07-05

## 2021-07-30 LAB
BILIRUB UR QL STRIP.AUTO: NEGATIVE
BILIRUBIN: NEGATIVE
GLUCOSE (URINE DIPSTICK): NEGATIVE MG/DL
GLUCOSE UR STRIP.AUTO-MCNC: NEGATIVE MG/DL
KETONES (URINE DIPSTICK): NEGATIVE MG/DL
KETONES UR STRIP.AUTO-MCNC: NEGATIVE MG/DL
MULTISTIX LOT#: 5077 NUMERIC
NITRITE UR QL STRIP.AUTO: NEGATIVE
NITRITE, URINE: NEGATIVE
PH UR STRIP.AUTO: 5 [PH] (ref 5–8)
PH, URINE: 5.5 (ref 4.5–8)
PROT UR STRIP.AUTO-MCNC: 100 MG/DL
PROTEIN (URINE DIPSTICK): >=300 MG/DL
RBC #/AREA URNS AUTO: >10 /HPF
SP GR UR STRIP.AUTO: 1.02 (ref 1–1.03)
SPECIFIC GRAVITY: 1.03 (ref 1–1.03)
URINE-COLOR: YELLOW
UROBILINOGEN UR STRIP.AUTO-MCNC: <2 MG/DL
UROBILINOGEN,SEMI-QN: 0.2 MG/DL (ref 0–1.9)
WBC #/AREA URNS AUTO: >50 /HPF

## 2021-07-30 PROCEDURE — 87077 CULTURE AEROBIC IDENTIFY: CPT | Performed by: NURSE PRACTITIONER

## 2021-07-30 PROCEDURE — 87086 URINE CULTURE/COLONY COUNT: CPT | Performed by: NURSE PRACTITIONER

## 2021-07-30 PROCEDURE — 87186 SC STD MICRODIL/AGAR DIL: CPT | Performed by: NURSE PRACTITIONER

## 2021-07-30 PROCEDURE — 81003 URINALYSIS AUTO W/O SCOPE: CPT | Performed by: NURSE PRACTITIONER

## 2021-07-30 PROCEDURE — 81001 URINALYSIS AUTO W/SCOPE: CPT | Performed by: NURSE PRACTITIONER

## 2021-07-30 PROCEDURE — 99213 OFFICE O/P EST LOW 20 MIN: CPT | Performed by: NURSE PRACTITIONER

## 2021-07-30 RX ORDER — CEPHALEXIN 500 MG/1
500 CAPSULE ORAL 2 TIMES DAILY
Qty: 20 CAPSULE | Refills: 0 | Status: SHIPPED | OUTPATIENT
Start: 2021-07-30 | End: 2021-08-09

## 2021-07-30 RX ORDER — CLONAZEPAM 1 MG/1
1 TABLET ORAL
COMMUNITY

## 2021-07-30 RX ORDER — CEFDINIR 300 MG/1
300 CAPSULE ORAL 2 TIMES DAILY
COMMUNITY
Start: 2021-07-06 | End: 2021-08-27

## 2021-07-30 RX ORDER — PHENAZOPYRIDINE HYDROCHLORIDE 100 MG/1
100 TABLET, FILM COATED ORAL 3 TIMES DAILY PRN
Qty: 15 TABLET | Refills: 0 | Status: SHIPPED | OUTPATIENT
Start: 2021-07-30 | End: 2021-08-27

## 2021-07-30 NOTE — PROGRESS NOTES
HPI:   Patient presents with:  UTI: Urinary frequency, pain, burning, blood       Timmothy Hali is a 60year old female who complains of burning with urination and frequency. She has had symptoms for 1 day.    She also complains of concern since she is l alter the pH and may be helpful to prevent future infections. Follow-up if symptoms worsen or not better in the next 48-72 hours.            [address], DEBRA, 7/30/2021, 3:18 PM

## 2021-07-31 RX ORDER — MONTELUKAST SODIUM 10 MG/1
TABLET ORAL
Qty: 90 TABLET | Refills: 1 | Status: SHIPPED | OUTPATIENT
Start: 2021-07-31 | End: 2021-11-20

## 2021-07-31 NOTE — TELEPHONE ENCOUNTER
Future appt:     Your appointments     Date & Time Appointment Department [facility])    Aug 10, 2021  3:30 PM CDT Follow Up Visit with Veronica Alcaraz MD [address], Christo Redd ([facility])            [address] [facility]

## 2021-08-01 NOTE — PATIENT INSTRUCTIONS
Urine culture pending. Get Pyridium for the symptoms. Aware will change urine to orange. Continue to push fluids. Encouraged to eat yogurt or take probiotic daily while on antibiotic for prevention of a yeast infection.   Cranberry juice may alter the p

## 2021-08-02 ENCOUNTER — TELEPHONE (OUTPATIENT)
Dept: FAMILY MEDICINE CLINIC | Facility: CLINIC | Age: 56
End: 2021-08-02

## 2021-08-02 NOTE — TELEPHONE ENCOUNTER
----- Message from DEBRA Meadows sent at 8/1/2021  3:40 PM CDT -----  Urine is positive for an UTI and should respond to the current antibiotic. Note to MyChart.

## 2021-08-04 ENCOUNTER — PATIENT MESSAGE (OUTPATIENT)
Dept: FAMILY MEDICINE CLINIC | Facility: CLINIC | Age: 56
End: 2021-08-04

## 2021-08-04 NOTE — TELEPHONE ENCOUNTER
From: Soham Wisdom  To:  Brigitte Strickland MD  Sent: 8/4/2021 9:45 AM CDT  Subject: Prescription Question    The brio ellipta inhaler has been working well, but rather than getting one at the the [facility], I can get 3 (or a 90 day supply) for the

## 2021-08-10 NOTE — TELEPHONE ENCOUNTER
Deepti from [facility] in [facility] calling and states pt was admitted 8/7/21-8/10/21 for Psychosis. They have given her enough medication for a month but advised she needs a referral to receive more from a psychiatrist out here.      Pt will be discharg

## 2021-08-16 NOTE — TELEPHONE ENCOUNTER
Patient informed she should schedule an appt  With Samara Meredith. Phone number given. Patient states she has seen Chaya Kong in the  Past.  She will call his office to see if she can schedule with him as she has Medicare.   Gato Lira CMA, 08/16/21, 1

## 2021-08-17 NOTE — TELEPHONE ENCOUNTER
Phoned patient. She had not yet set up appt. She will do this today and call back to let us know  Who she will be seeing.   Elvia Guadarrama CMA, 08/17/21, 10:56 AM

## 2021-08-18 NOTE — TELEPHONE ENCOUNTER
Please note [facility]. Patient is on a waiting list.    I phoned Damien Mohr. They states they have reached their Medicare Ander Hamilton. They gave patient 2 other places to call for appt. Patient is now on a waiting list for  [facility].

## 2021-08-18 NOTE — TELEPHONE ENCOUNTER
I have spoken to patient directly regarding   Referral to Psychiatrist.    She was to get back with me both times as to whom she was going to follow up with:   or Yeison Pride. Zero return call to update.

## 2021-08-27 PROBLEM — K59.04 CHRONIC IDIOPATHIC CONSTIPATION: Status: ACTIVE | Noted: 2021-08-27

## 2021-08-27 PROBLEM — F23 BRIEF PSYCHOTIC DISORDER (HCC): Status: ACTIVE | Noted: 2021-08-27

## 2021-08-27 NOTE — PROGRESS NOTES
[place] [facility] [place]  PROGRESS NOTE  Chief Complaint:   Patient presents with:  Hospital F/U: 8/7-8/10 psychosis      HPI:   This is a 60year old female coming in for hospital follow-up. [place].   Just p day.  She has been taking that and feels like that has not made any difference. Since she has been back home her asthma has been flaring slightly. She is using the Bashir Mussel daily.   She had to use her nebulizer earlier today because her lungs were t Ampicillin >=32 Resistant      Ampicillin + Sulbactam 8 Sensitive      Cefazolin <=4 Sensitive      Ciprofloxacin <=0.25 Sensitive      Gentamicin <=1 Sensitive      Meropenem <=0.25 Sensitive      Levofloxacin <=0.12 Sensitive      Nitrofurantoin 128 Resi ELLIPTA) 200-25 MCG/INH Inhalation Aerosol Powder, Breath Activated Inhale 1 puff into the lungs daily. 3 each 1   • MONTELUKAST SODIUM 10 MG Oral Tab TAKE 1 TABLET DAILY 90 tablet 1   • clonazePAM 1 MG Oral Tab Take 1 mg by mouth.      • Ondansetron HCl (Z MG Oral Tablet Dispersible Take 8 mg by mouth 3 (three) times daily as needed. 0   • albuterol sulfate (2.5 MG/3ML) 0.083% Inhalation Nebu Soln Take 2.5 mg by nebulization every 4 (four) hours as needed.        • Carbonyl Iron (FEOSOL) 45 MG Oral Tab Take Weight as of this encounter: 340 lb 12.8 oz (154.6 kg). Vital signs reviewed. Appears stated age, well groomed. Physical Exam:  GEN: She is alert and smiling today. She walked in with a rolling walker.   She was unable to climb up on the exam table tod PANEL (14); Future  - ASSAY, THYROID STIM HORMONE; Future  - URIC ACID; Future  - MAGNESIUM; Future    2. Chronic idiopathic constipation  Her constipation was horrible earlier this month. Her symptoms have now significantly improved.   Plan: Continue with result of today.      Problem List:  Patient Active Problem List:     Allergic rhinitis     Asthma     Depression     Edema     Familial multiple lipoprotein-type hyperlipidemia     Low back pain     Symptomatic menopausal or female climacteric states     C

## 2021-08-30 ENCOUNTER — PATIENT MESSAGE (OUTPATIENT)
Dept: FAMILY MEDICINE CLINIC | Facility: CLINIC | Age: 56
End: 2021-08-30

## 2021-08-30 RX ORDER — ALBUTEROL SULFATE 2.5 MG/3ML
2.5 SOLUTION RESPIRATORY (INHALATION) EVERY 4 HOURS PRN
Qty: 50 EACH | Refills: 5 | Status: SHIPPED | OUTPATIENT
Start: 2021-08-30

## 2021-08-30 RX ORDER — MIRTAZAPINE 30 MG/1
TABLET, FILM COATED ORAL
Qty: 90 TABLET | Refills: 1 | Status: SHIPPED | OUTPATIENT
Start: 2021-08-30

## 2021-08-30 NOTE — TELEPHONE ENCOUNTER
Mirtazapine: 2/22/21    Future appt:     Your appointments     Date & Time Appointment Department [facility])    Sep 18, 2021  9:45 AM CDT Laboratory Visit with REF [facility] ([facility] [place])        Sep 24, 2021  3:30 PM CDT Follow Up

## 2021-08-30 NOTE — TELEPHONE ENCOUNTER
From: Hoda Wisdom  To: Araceli Rubin MD  Sent: 8/30/2021 12:08 PM CDT  Subject: Prescription Question    May I get a prescription for Albuterol for my nebulizer, please?

## 2021-09-03 ENCOUNTER — PATIENT MESSAGE (OUTPATIENT)
Dept: FAMILY MEDICINE CLINIC | Facility: CLINIC | Age: 56
End: 2021-09-03

## 2021-09-03 RX ORDER — DIVALPROEX SODIUM 250 MG/1
250 TABLET, DELAYED RELEASE ORAL
Refills: 0 | Status: CANCELLED | OUTPATIENT
Start: 2021-09-03

## 2021-09-03 RX ORDER — DIVALPROEX SODIUM 250 MG/1
250 TABLET, DELAYED RELEASE ORAL 2 TIMES DAILY
Qty: 180 TABLET | Refills: 1 | Status: SHIPPED | OUTPATIENT
Start: 2021-09-03 | End: 2021-12-29 | Stop reason: ALTCHOICE

## 2021-09-03 NOTE — TELEPHONE ENCOUNTER
From: Paul Wisdom  To: Tonia Coe MD  Sent: 9/3/2021 8:37 AM CDT  Subject: Prescription Question    The [facility] is not responding to request for refill of the Divalproex delayed release 250mg. May I get the refill sent from you instead?

## 2021-09-18 ENCOUNTER — LABORATORY ENCOUNTER (OUTPATIENT)
Dept: LAB | Age: 56
End: 2021-09-18
Attending: FAMILY MEDICINE
Payer: MEDICARE

## 2021-09-18 DIAGNOSIS — K59.04 CHRONIC IDIOPATHIC CONSTIPATION: ICD-10-CM

## 2021-09-18 DIAGNOSIS — F23 BRIEF PSYCHOTIC DISORDER (HCC): ICD-10-CM

## 2021-09-18 DIAGNOSIS — J45.40 MODERATE PERSISTENT ASTHMA WITHOUT COMPLICATION: ICD-10-CM

## 2021-09-18 PROCEDURE — 83735 ASSAY OF MAGNESIUM: CPT | Performed by: FAMILY MEDICINE

## 2021-09-18 PROCEDURE — 84550 ASSAY OF BLOOD/URIC ACID: CPT | Performed by: FAMILY MEDICINE

## 2021-09-18 PROCEDURE — 80050 GENERAL HEALTH PANEL: CPT | Performed by: FAMILY MEDICINE

## 2021-09-20 ENCOUNTER — TELEPHONE (OUTPATIENT)
Dept: FAMILY MEDICINE CLINIC | Facility: CLINIC | Age: 56
End: 2021-09-20

## 2021-09-20 NOTE — TELEPHONE ENCOUNTER
----- Message from Lenward Epley, MD sent at 9/20/2021  8:12 AM CDT -----  Thyroid is normal.  Uric acid level is mildly elevated at 8.8. Blood sugar is 117 which is mildly elevated. Kidney function is about the same as it has been with a GFR of 56.

## 2021-09-24 NOTE — PROGRESS NOTES
[place] [facility] [place]  PROGRESS NOTE  Chief Complaint:   Patient presents with:  Medication Follow-Up: one month f/u      HPI:   This is a 60year old female coming in for follow-up. She said that the constipation is significantly better.   She i g/dL    Albumin 3.8 3.4 - 5.0 g/dL    Globulin  3.6 2.8 - 4.4 g/dL    A/G Ratio 1.1 1.0 - 2.0    FASTING Yes    ASSAY, THYROID STIM HORMONE   Result Value Ref Range    TSH 2.990 0.358 - 3.740 mIU/mL   URIC ACID   Result Value Ref Range    Uric Acid 8.8 (H) History   Problem Relation Age of Onset   • Breast Cancer Maternal Grandmother 61        approx age     Allergies:    Sulfa Antibiotics       RASH, SWELLING  Latex                   RASH  Mastisol Adhesive       RASH    Comment:ADHESIVE TAPE  Nitrofurantoi puffs into the lungs every 4 (four) hours as needed. 3 Inhaler 1   • XELJANZ XR 11 MG Oral Tablet 24 Hr Take 11 mg by mouth daily.      • TRAMADOL HCL 50 MG Oral Tab TAKE 1 TABLET BY MOUTH EVERY 4 HOURS AS NEEDED FOR PAIN 30 tablet 0   • glycerin-Hypromello chest pain, chest pressure, chest discomfort, palpitations, edema, dyspnea on exertion or at rest.  RESPIRATORY:  Denies shortness of breath, wheezing, cough or sputum.   GASTROINTESTINAL:  Denies abdominal pain, nausea, vomiting, constipation, diarrhea, or rubs or gallops. LUNGS: Clear to auscultation bilterally, no rales/rhonchi/wheezing. ABDOMEN:  Soft, nondistended, nontender, bowel sounds normal in all 4 quadrants, no masses, no hepatosplenomegaly.   BACK: No tenderness, no spasm, SLR test negative, FRO changing symptoms. Patient is to call with any side effects or complications from the treatments as a result of today.      Problem List:  Patient Active Problem List:     Allergic rhinitis     Asthma     Depression     Edema     Familial multiple lipoprot

## 2021-09-28 ENCOUNTER — OFFICE VISIT (OUTPATIENT)
Dept: FAMILY MEDICINE CLINIC | Facility: CLINIC | Age: 56
End: 2021-09-28
Payer: MEDICARE

## 2021-09-28 VITALS
SYSTOLIC BLOOD PRESSURE: 108 MMHG | HEART RATE: 89 BPM | DIASTOLIC BLOOD PRESSURE: 62 MMHG | OXYGEN SATURATION: 96 % | WEIGHT: 293 LBS | HEIGHT: 63.25 IN | BODY MASS INDEX: 51.27 KG/M2 | RESPIRATION RATE: 20 BRPM | TEMPERATURE: 97 F

## 2021-09-28 DIAGNOSIS — G47.33 OBSTRUCTIVE SLEEP APNEA SYNDROME: Primary | ICD-10-CM

## 2021-09-28 DIAGNOSIS — G47.19 EXCESSIVE DAYTIME SLEEPINESS: ICD-10-CM

## 2021-09-28 PROCEDURE — 99214 OFFICE O/P EST MOD 30 MIN: CPT | Performed by: FAMILY MEDICINE

## 2021-09-28 NOTE — PROGRESS NOTES
[facility]        HPI:   This is a 60year old female coming in for Patient presents with:  Obstructive Sleep Apnea (FELISA)      HPI:   Patient states she has  A hard time falling asleep but thinks it is related to [facility] • Hyperlipidemia    • Obesity    • Osteoarthrosis 8/16/2016   • Rheumatoid arthritis ([facility] [address] 7/1/2016   • Sleep apnea    • Vitamin D deficiency 11/19/2009    per.  Dr. Karyle Gambler      Past Surgical History:   Procedure Laterality Date   • APPENDECTOMY     • C • Ondansetron HCl (ZOFRAN) 4 mg tablet Take 1 tablet (4 mg total) by mouth every 8 (eight) hours as needed for Nausea.  20 tablet 2   • ATOMOXETINE HCL 60 MG Oral Cap TAKE 1 CAPSULE DAILY 90 capsule 1   • ATORVASTATIN 20 MG Oral Tab TAKE 1 TABLET NIGHTLY states     Class 3 severe obesity due to excess calories with serious comorbidity and body mass index (BMI) of 50.0 to 59.9 in adult [facility])     Obstructive sleep apnea syndrome     S/P CHRISTINA-BSO (total abdominal hysterectomy and bilateral salpingo-oophorectom 134/66    Ideal body weight: 53 kg (116 lb 12.6 oz)  Adjusted ideal body weight: 93.3 kg (205 lb 9.5 oz)    Vital signs reviewed. Physical Exam  Constitutional:       General: She is not in acute distress. Appearance: Normal appearance.  She is well-de Avoid eating/ drinking within 2 hours of bedtime. 3. Retire electronics including television outside your bedroom 2 hours prior to your bedtime. 4. Insure your sleeping area is cool (62-68 degrees), quiet and dark.    5.  Avoid alcohol and sedative hypn complications from the treatments as a result of today. \" This note was created utilizing Dragon speech recognition software. Please excuse any grammatical errors. Call my office if you have any questions regarding this note.  \"     Bandar Neely MD

## 2021-09-28 NOTE — PATIENT INSTRUCTIONS
How to Sleep Better:     1. Go to sleep at the same time every day and wake up at the same time every day. 2. Avoid eating/ drinking within 2 hours of bedtime.    3. Retire electronics including television outside your bedroom 2 hours prior to your bedtim

## 2021-10-05 ENCOUNTER — TELEPHONE (OUTPATIENT)
Dept: FAMILY MEDICINE CLINIC | Facility: CLINIC | Age: 56
End: 2021-10-05

## 2021-10-05 DIAGNOSIS — G47.33 OSA (OBSTRUCTIVE SLEEP APNEA): Primary | ICD-10-CM

## 2021-10-05 NOTE — TELEPHONE ENCOUNTER
[facility] requesting new prescription for CPAP and supplies due to patient recently started Medicare. Please enter.

## 2021-11-20 RX ORDER — MONTELUKAST SODIUM 10 MG/1
TABLET ORAL
Qty: 90 TABLET | Refills: 1 | Status: SHIPPED | OUTPATIENT
Start: 2021-11-20

## 2021-11-20 RX ORDER — ATORVASTATIN CALCIUM 20 MG/1
TABLET, FILM COATED ORAL
Qty: 90 TABLET | Refills: 1 | Status: SHIPPED | OUTPATIENT
Start: 2021-11-20

## 2021-11-20 RX ORDER — ATOMOXETINE 60 MG/1
CAPSULE ORAL
Qty: 90 CAPSULE | Refills: 1 | Status: SHIPPED | OUTPATIENT
Start: 2021-11-20

## 2021-11-20 NOTE — TELEPHONE ENCOUNTER
Breo: ?  Atomoxetine: 6/28/21  Montelukast: 7/31/21  Atorvastatin: 6/28/21       Return in about 3 months (around 12/24/2021). Future appt:     Your appointments     Date & Time Appointment Department [facility])    Dec 29, 2021  3:00 PM CST Follow up - Ext

## 2021-12-01 ENCOUNTER — PATIENT MESSAGE (OUTPATIENT)
Dept: FAMILY MEDICINE CLINIC | Facility: CLINIC | Age: 56
End: 2021-12-01

## 2021-12-01 ENCOUNTER — TELEPHONE (OUTPATIENT)
Dept: FAMILY MEDICINE CLINIC | Facility: CLINIC | Age: 56
End: 2021-12-01

## 2021-12-01 NOTE — TELEPHONE ENCOUNTER
From: Roxana Wisdom  To: Omar Ledesma MD  Sent: 12/1/2021 12:23 PM CST  Subject: Pre approval/refill    May I please get the pre approval sent for Esomeprazole magnesium so I can get it filled at the pharmacy please.  I copied their info here:  prior

## 2021-12-03 NOTE — TELEPHONE ENCOUNTER
PriorAuthorization form being faxed.   JOSE [facility], [facility], 12/03/21, 9:27 AM

## 2021-12-07 RX ORDER — DULOXETIN HYDROCHLORIDE 30 MG/1
CAPSULE, DELAYED RELEASE ORAL
Qty: 90 CAPSULE | Refills: 1 | Status: SHIPPED | OUTPATIENT
Start: 2021-12-07

## 2021-12-07 NOTE — TELEPHONE ENCOUNTER
Duloxetine: 6/15/21    Future appt:     Your appointments     Date & Time Appointment Department [facility])    Dec 29, 2021  3:00 PM CST Follow up - Extended with Akshat Kennedy MD 25 [facility], [address]

## 2021-12-29 ENCOUNTER — OFFICE VISIT (OUTPATIENT)
Dept: FAMILY MEDICINE CLINIC | Facility: CLINIC | Age: 56
End: 2021-12-29
Payer: MEDICARE

## 2021-12-29 VITALS
SYSTOLIC BLOOD PRESSURE: 120 MMHG | DIASTOLIC BLOOD PRESSURE: 68 MMHG | TEMPERATURE: 99 F | HEART RATE: 105 BPM | RESPIRATION RATE: 16 BRPM | HEIGHT: 63.25 IN | BODY MASS INDEX: 51.27 KG/M2 | WEIGHT: 293 LBS

## 2021-12-29 DIAGNOSIS — S61.411D LACERATION OF RIGHT HAND WITHOUT FOREIGN BODY, SUBSEQUENT ENCOUNTER: ICD-10-CM

## 2021-12-29 DIAGNOSIS — R60.9 FLUID RETENTION: ICD-10-CM

## 2021-12-29 DIAGNOSIS — K59.04 CHRONIC IDIOPATHIC CONSTIPATION: ICD-10-CM

## 2021-12-29 DIAGNOSIS — J45.40 MODERATE PERSISTENT ASTHMA WITHOUT COMPLICATION: Primary | ICD-10-CM

## 2021-12-29 PROBLEM — S61.411A LACERATION OF RIGHT HAND WITHOUT FOREIGN BODY: Status: ACTIVE | Noted: 2021-12-29

## 2021-12-29 PROCEDURE — 99214 OFFICE O/P EST MOD 30 MIN: CPT | Performed by: FAMILY MEDICINE

## 2021-12-29 RX ORDER — ESTRADIOL 0.1 MG/G
1 CREAM VAGINAL
COMMUNITY
Start: 2021-09-30

## 2021-12-29 RX ORDER — ARIPIPRAZOLE 10 MG/1
5 TABLET, ORALLY DISINTEGRATING ORAL DAILY
COMMUNITY
Start: 2021-11-30 | End: 2021-12-29 | Stop reason: DRUGHIGH

## 2021-12-29 RX ORDER — MIRABEGRON 50 MG/1
1 TABLET, FILM COATED, EXTENDED RELEASE ORAL DAILY
COMMUNITY
Start: 2021-12-26

## 2021-12-29 RX ORDER — ARIPIPRAZOLE 5 MG/1
5 TABLET ORAL DAILY
COMMUNITY

## 2021-12-29 NOTE — PROGRESS NOTES
[facility]  Chief Complaint:   Patient presents with:  Suture Removal  Follow - Up      HPI:   This is a 60year old female coming in for follow-up.     She reports that she sustained an injury to the back of her right hand 4.0 - 11.0 x10(3) uL    RBC 4.85 3.80 - 5.30 x10(6)uL    HGB 14.2 12.0 - 16.0 g/dL    HCT 44.9 35.0 - 48.0 %    .0 150.0 - 450.0 10(3)uL    MCV 92.6 80.0 - 100.0 fL    MCH 29.3 26.0 - 34.0 pg    MCHC 31.6 31.0 - 37.0 g/dL    RDW 14.0 %    Neutrophil G              Adhesive Tape           RASH  Levofloxacin In D5w     RASH    Comment:Developed rash & phlebitis  Current Meds:  Current Outpatient Medications   Medication Sig Dispense Refill   • estradiol 0.1 MG/GM Vaginal Cream Place 1 g vaginally twice mg by mouth every 6 (six) hours as needed. • glycerin-Hypromellose- 0.2-0.2-1 % Ophthalmic Solution Place 1 drop into both eyes 3 (three) times daily as needed. • omega-3 fatty acids 1000 MG Oral Cap Take 1,000 mg by mouth daily.      • Fluti controlled recently.     EXAM:   /68 (BP Location: Left arm, Patient Position: Sitting, Cuff Size: large)   Pulse 105   Temp 99 °F (37.2 °C) (Tympanic)   Resp 16   Ht 5' 3.25\" (1.607 m)   Wt (!) 343 lb 9.6 oz (155.9 kg)   BMI 60.39 kg/m²  Estimated b significantly. 3. Laceration of right hand without foreign body, subsequent encounter  She has a laceration in the back of her right hand. The sutures were removed without any complications. No additional treatment is needed now.     4. Chronic idiopat LFTs     Enterocolitis     Chronic idiopathic constipation     Brief psychotic disorder ([facility] [address]     Laceration of right hand without foreign body      Lenward Epley, MD  12/29/2021  4:49 PM

## 2022-01-07 ENCOUNTER — PATIENT MESSAGE (OUTPATIENT)
Dept: FAMILY MEDICINE CLINIC | Facility: CLINIC | Age: 57
End: 2022-01-07

## 2022-01-07 DIAGNOSIS — H91.93 DECREASED HEARING, BILATERAL: Primary | ICD-10-CM

## 2022-01-07 NOTE — TELEPHONE ENCOUNTER
From: Edith Wisdom  To: Arturo Rubin MD  Sent: 1/7/2022 12:38 PM CST  Subject: Referral     I wanted to get a hearing test at [address] [place] [address] hearing clinic. They said because of medicsre i would need a referral sent over. Can I get that, please?

## 2022-02-14 ENCOUNTER — PATIENT MESSAGE (OUTPATIENT)
Dept: FAMILY MEDICINE CLINIC | Facility: CLINIC | Age: 57
End: 2022-02-14

## 2022-02-14 NOTE — TELEPHONE ENCOUNTER
Mammogram report reviewed. It showed multiple cysts in both breasts that appear to be similar to previous evaluations. The recommendation is to have a repeat mammogram and ultrasound in 6 months. I will enter the order for that examination.

## 2022-02-14 NOTE — TELEPHONE ENCOUNTER
From: Desiree Wisdom  To: Lien Altamirano MD  Sent: 2/14/2022 3:01 PM CST  Subject: Mammogram     I haven't seen results from the mammogram yet and it's been a week.

## 2022-02-14 NOTE — TELEPHONE ENCOUNTER
Please review Mammogram Report. Please advise-Continue 6 Month Evaluations?   Marisol Mesa CMA, 02/14/22, 3:29 PM

## 2022-03-14 RX ORDER — ESTRADIOL 1 MG/1
TABLET ORAL
Qty: 90 TABLET | Refills: 1 | Status: SHIPPED | OUTPATIENT
Start: 2022-03-14

## 2022-03-14 NOTE — TELEPHONE ENCOUNTER
Esomeprazole:  5/28/21    Future appt: Your appointments     Date & Time Appointment Department [facility])    Mar 30, 2022  3:30 PM CDT Sleep Follow Up with [address], [facility], [address], [facility] ([facility])        Jun 11, 2022  8:00 AM CDT Laboratory Visit with REF [facility] ([facility])        Jun 29, 2022  3:00 PM CDT Medicare Annual Well Visit with Rylan Carlson MD [facility], [facility] ([place])            [address] [facility] [place]  [phone]  [facility]  [facility] [place]  [phone] [id number]  [phone]        Last Appointment with provider:   12/29/2021  Last appointment at [facility] [place]:  12/29/2021  Cholesterol, Total (mg/dL)   Date Value   06/09/2018 163     HDL Cholesterol (mg/dL)   Date Value   06/09/2018 63     LDL Cholesterol (mg/dL)   Date Value   06/09/2018 81     Triglycerides (mg/dL)   Date Value   06/09/2018 94     No results found for: EAG, A1C  Lab Results   Component Value Date    TSH 2.990 09/18/2021       No follow-ups on file.

## 2022-03-14 NOTE — TELEPHONE ENCOUNTER
Estradiol: 9/30/21    Future appt: Your appointments     Date & Time Appointment Department [facility])    Mar 30, 2022  3:30 PM CDT Sleep Follow Up with [address] [facility] [address], [facility], [address], [facility] ([facility])        Jun 11, 2022  8:00 AM CDT Laboratory Visit with REF [facility] ([facility])        Jun 29, 2022  3:00 PM CDT Medicare Annual Well Visit with Francisca Guzman MD [facility] ([place])            [address] [facility] [place]  [phone]  [facility]  [facility] [place]  [phone] [id number]  [phone]        Last Appointment with provider:   12/29/2021  Last appointment at [facility] [place]:  12/29/2021  Cholesterol, Total (mg/dL)   Date Value   06/09/2018 163     HDL Cholesterol (mg/dL)   Date Value   06/09/2018 63     LDL Cholesterol (mg/dL)   Date Value   06/09/2018 81     Triglycerides (mg/dL)   Date Value   06/09/2018 94     No results found for: EAG, A1C  Lab Results   Component Value Date    TSH 2.990 09/18/2021       No follow-ups on file.

## 2022-03-30 ENCOUNTER — OFFICE VISIT (OUTPATIENT)
Dept: FAMILY MEDICINE CLINIC | Facility: CLINIC | Age: 57
End: 2022-03-30
Payer: MEDICARE

## 2022-03-30 VITALS
TEMPERATURE: 97 F | BODY MASS INDEX: 51.27 KG/M2 | HEIGHT: 63.25 IN | SYSTOLIC BLOOD PRESSURE: 116 MMHG | HEART RATE: 96 BPM | WEIGHT: 293 LBS | RESPIRATION RATE: 20 BRPM | OXYGEN SATURATION: 96 % | DIASTOLIC BLOOD PRESSURE: 74 MMHG

## 2022-03-30 DIAGNOSIS — G47.33 OBSTRUCTIVE SLEEP APNEA SYNDROME: Primary | ICD-10-CM

## 2022-03-30 PROCEDURE — 99214 OFFICE O/P EST MOD 30 MIN: CPT | Performed by: NURSE PRACTITIONER

## 2022-03-30 RX ORDER — QUETIAPINE FUMARATE 25 MG/1
1 TABLET, FILM COATED ORAL DAILY
COMMUNITY
Start: 2022-03-01

## 2022-04-14 NOTE — TELEPHONE ENCOUNTER
Future appt: Your appointments     Date & Time Appointment Department [facility])    Jun 11, 2022  8:00 AM CDT Laboratory Visit with REF Umm Rodriguez Reference Lab ([facility] [place])        Jun 29, 2022  3:00 PM CDT Medicare Annual Well Visit with Charan Medina MD 25 [facility] ([facility])        Sep 28, 2022  3:30 PM CDT Sleep Follow Up with 55 [facility], [address] [place] [address], [facility])            25 [facility] [place]  [address] [id number]  [address] Ref Lab [place]  [place] [phone]  [phone]          Last Appointment with provider:   12/29/2021  Last appointment at [facility]:  3/30/2022 - CS - FELISA    Last px - 6/15/2021    Cholesterol, Total (mg/dL)   Date Value   06/09/2018 163     HDL Cholesterol (mg/dL)   Date Value   06/09/2018 63     LDL Cholesterol (mg/dL)   Date Value   06/09/2018 81     Triglycerides (mg/dL)   Date Value   06/09/2018 94     No results found for: EAG, A1C  Lab Results   Component Value Date    TSH 2.990 09/18/2021       No follow-ups on file.

## 2022-04-15 RX ORDER — ATORVASTATIN CALCIUM 20 MG/1
TABLET, FILM COATED ORAL
Qty: 90 TABLET | Refills: 1 | Status: SHIPPED | OUTPATIENT
Start: 2022-04-15

## 2022-04-15 RX ORDER — ATOMOXETINE 60 MG/1
CAPSULE ORAL
Qty: 90 CAPSULE | Refills: 1 | Status: SHIPPED | OUTPATIENT
Start: 2022-04-15

## 2022-04-27 RX ORDER — MIRTAZAPINE 30 MG/1
30 TABLET, FILM COATED ORAL NIGHTLY
Qty: 90 TABLET | Refills: 1 | Status: SHIPPED | OUTPATIENT
Start: 2022-04-27

## 2022-05-09 RX ORDER — MONTELUKAST SODIUM 10 MG/1
TABLET ORAL
Qty: 90 TABLET | Refills: 1 | Status: SHIPPED | OUTPATIENT
Start: 2022-05-09

## 2022-05-09 NOTE — TELEPHONE ENCOUNTER
Montelukast: 11/20/21  Breo: 11/20/21    Future appt: Your appointments     Date & Time Appointment Department [facility])    Jun 11, 2022  8:00 AM CDT Laboratory Visit with REF Tom Loyd Reference Lab ([facility] Ref [facility] [place])        Jun 29, 2022  3:00 PM CDT Medicare Annual Well Visit with Vamsi Agarwal MD 25 [facility] ([facility])        Sep 28, 2022  3:30 PM CDT Sleep Follow Up with Cesar Henderson, [address] [place] [address] ([facility])            25 [facility] [place]  [address] [address] [phone]  [address] Ref [facility] [place]  [place] [address] [address]  [phone]        Last Appointment with provider:   12/29/2021  Last appointment at [facility] [place]:  3/30/2022  Cholesterol, Total (mg/dL)   Date Value   06/09/2018 163     HDL Cholesterol (mg/dL)   Date Value   06/09/2018 63     LDL Cholesterol (mg/dL)   Date Value   06/09/2018 81     Triglycerides (mg/dL)   Date Value   06/09/2018 94     No results found for: EAG, A1C  Lab Results   Component Value Date    TSH 2.990 09/18/2021       No follow-ups on file.

## 2022-06-11 ENCOUNTER — LABORATORY ENCOUNTER (OUTPATIENT)
Dept: LAB | Age: 57
End: 2022-06-11
Attending: FAMILY MEDICINE
Payer: MEDICARE

## 2022-06-11 DIAGNOSIS — J45.40 MODERATE PERSISTENT ASTHMA WITHOUT COMPLICATION: ICD-10-CM

## 2022-06-11 DIAGNOSIS — F33.1 MODERATE EPISODE OF RECURRENT MAJOR DEPRESSIVE DISORDER (HCC): ICD-10-CM

## 2022-06-11 DIAGNOSIS — F23 BRIEF PSYCHOTIC DISORDER (HCC): ICD-10-CM

## 2022-06-11 LAB
ALBUMIN SERPL-MCNC: 3.5 G/DL (ref 3.4–5)
ALBUMIN/GLOB SERPL: 0.9 {RATIO} (ref 1–2)
ALP LIVER SERPL-CCNC: 87 U/L
ALT SERPL-CCNC: 65 U/L
ANION GAP SERPL CALC-SCNC: 12 MMOL/L (ref 0–18)
AST SERPL-CCNC: 75 U/L (ref 15–37)
BASOPHILS # BLD AUTO: 0.12 X10(3) UL (ref 0–0.2)
BASOPHILS NFR BLD AUTO: 1.4 %
BILIRUB SERPL-MCNC: 0.5 MG/DL (ref 0.1–2)
BUN BLD-MCNC: 15 MG/DL (ref 7–18)
CALCIUM BLD-MCNC: 9.4 MG/DL (ref 8.5–10.1)
CHLORIDE SERPL-SCNC: 104 MMOL/L (ref 98–112)
CO2 SERPL-SCNC: 21 MMOL/L (ref 21–32)
CREAT BLD-MCNC: 1.12 MG/DL
EOSINOPHIL # BLD AUTO: 0.14 X10(3) UL (ref 0–0.7)
EOSINOPHIL NFR BLD AUTO: 1.7 %
ERYTHROCYTE [DISTWIDTH] IN BLOOD BY AUTOMATED COUNT: 14.1 %
FASTING STATUS PATIENT QL REPORTED: YES
GLOBULIN PLAS-MCNC: 4 G/DL (ref 2.8–4.4)
GLUCOSE BLD-MCNC: 256 MG/DL (ref 70–99)
HCT VFR BLD AUTO: 48.3 %
HGB BLD-MCNC: 14.7 G/DL
IMM GRANULOCYTES # BLD AUTO: 0.12 X10(3) UL (ref 0–1)
IMM GRANULOCYTES NFR BLD: 1.4 %
LYMPHOCYTES # BLD AUTO: 1.11 X10(3) UL (ref 1–4)
LYMPHOCYTES NFR BLD AUTO: 13.4 %
MCH RBC QN AUTO: 29.6 PG (ref 26–34)
MCHC RBC AUTO-ENTMCNC: 30.4 G/DL (ref 31–37)
MCV RBC AUTO: 97.2 FL
MONOCYTES # BLD AUTO: 0.77 X10(3) UL (ref 0.1–1)
MONOCYTES NFR BLD AUTO: 9.3 %
NEUTROPHILS # BLD AUTO: 6.04 X10 (3) UL (ref 1.5–7.7)
NEUTROPHILS # BLD AUTO: 6.04 X10(3) UL (ref 1.5–7.7)
NEUTROPHILS NFR BLD AUTO: 72.8 %
OSMOLALITY SERPL CALC.SUM OF ELEC: 294 MOSM/KG (ref 275–295)
PLATELET # BLD AUTO: 190 10(3)UL (ref 150–450)
POTASSIUM SERPL-SCNC: 4.3 MMOL/L (ref 3.5–5.1)
PROT SERPL-MCNC: 7.5 G/DL (ref 6.4–8.2)
RBC # BLD AUTO: 4.97 X10(6)UL
SODIUM SERPL-SCNC: 137 MMOL/L (ref 136–145)
WBC # BLD AUTO: 8.3 X10(3) UL (ref 4–11)

## 2022-06-11 PROCEDURE — 85025 COMPLETE CBC W/AUTO DIFF WBC: CPT

## 2022-06-11 PROCEDURE — 36415 COLL VENOUS BLD VENIPUNCTURE: CPT

## 2022-06-11 PROCEDURE — 80053 COMPREHEN METABOLIC PANEL: CPT

## 2022-06-13 RX ORDER — DULOXETIN HYDROCHLORIDE 30 MG/1
CAPSULE, DELAYED RELEASE ORAL
Qty: 90 CAPSULE | Refills: 1 | Status: SHIPPED | OUTPATIENT
Start: 2022-06-13

## 2022-06-13 NOTE — TELEPHONE ENCOUNTER
Duloxetine: 12/7/21    Future appt: Your appointments     Date & Time Appointment Department [facility])    Jun 29, 2022  3:00 PM CDT Medicare Annual Well Visit with Noemi Cronin MD 25 [facility] ([facility])        Sep 28, 2022  3:30 PM CDT Sleep Follow Up with 55 [facility], [address] [place] [address], 909 [facility] ([facility])            25 [facility] [place]  [address] [phone]  [phone]        Last Appointment with provider:   12/29/2021  [facility]:  3/30/2022  Cholesterol, Total (mg/dL)   Date Value   06/09/2018 163     HDL Cholesterol (mg/dL)   Date Value   06/09/2018 63     LDL Cholesterol (mg/dL)   Date Value   06/09/2018 81     Triglycerides (mg/dL)   Date Value   06/09/2018 94     No results found for: EAG, A1C  Lab Results   Component Value Date    TSH 2.990 09/18/2021       No follow-ups on file.

## 2022-06-29 ENCOUNTER — OFFICE VISIT (OUTPATIENT)
Dept: FAMILY MEDICINE CLINIC | Facility: CLINIC | Age: 57
End: 2022-06-29
Payer: MEDICARE

## 2022-06-29 ENCOUNTER — LAB ENCOUNTER (OUTPATIENT)
Dept: LAB | Age: 57
End: 2022-06-29
Attending: FAMILY MEDICINE
Payer: MEDICARE

## 2022-06-29 VITALS
HEART RATE: 106 BPM | BODY MASS INDEX: 51.27 KG/M2 | SYSTOLIC BLOOD PRESSURE: 136 MMHG | OXYGEN SATURATION: 95 % | WEIGHT: 293 LBS | DIASTOLIC BLOOD PRESSURE: 90 MMHG | HEIGHT: 63.25 IN | RESPIRATION RATE: 24 BRPM | TEMPERATURE: 99 F

## 2022-06-29 DIAGNOSIS — F33.1 MODERATE EPISODE OF RECURRENT MAJOR DEPRESSIVE DISORDER (HCC): ICD-10-CM

## 2022-06-29 DIAGNOSIS — K59.04 CHRONIC IDIOPATHIC CONSTIPATION: ICD-10-CM

## 2022-06-29 DIAGNOSIS — G89.29 CHRONIC BILATERAL LOW BACK PAIN WITH RIGHT-SIDED SCIATICA: ICD-10-CM

## 2022-06-29 DIAGNOSIS — E11.9 CONTROLLED TYPE 2 DIABETES MELLITUS WITHOUT COMPLICATION, WITHOUT LONG-TERM CURRENT USE OF INSULIN (HCC): ICD-10-CM

## 2022-06-29 DIAGNOSIS — E78.49 FAMILIAL MULTIPLE LIPOPROTEIN-TYPE HYPERLIPIDEMIA: ICD-10-CM

## 2022-06-29 DIAGNOSIS — Z90.710 S/P TAH-BSO (TOTAL ABDOMINAL HYSTERECTOMY AND BILATERAL SALPINGO-OOPHORECTOMY): ICD-10-CM

## 2022-06-29 DIAGNOSIS — F23 BRIEF PSYCHOTIC DISORDER (HCC): ICD-10-CM

## 2022-06-29 DIAGNOSIS — J45.40 MODERATE PERSISTENT ASTHMA WITHOUT COMPLICATION: ICD-10-CM

## 2022-06-29 DIAGNOSIS — R60.9 FLUID RETENTION: ICD-10-CM

## 2022-06-29 DIAGNOSIS — M54.41 CHRONIC BILATERAL LOW BACK PAIN WITH RIGHT-SIDED SCIATICA: ICD-10-CM

## 2022-06-29 DIAGNOSIS — Z00.00 ENCOUNTER FOR ANNUAL HEALTH EXAMINATION: Primary | ICD-10-CM

## 2022-06-29 DIAGNOSIS — E66.01 CLASS 3 SEVERE OBESITY DUE TO EXCESS CALORIES WITH SERIOUS COMORBIDITY AND BODY MASS INDEX (BMI) OF 60.0 TO 69.9 IN ADULT (HCC): ICD-10-CM

## 2022-06-29 DIAGNOSIS — E55.9 VITAMIN D DEFICIENCY: ICD-10-CM

## 2022-06-29 DIAGNOSIS — M05.79 RHEUMATOID ARTHRITIS INVOLVING MULTIPLE SITES WITH POSITIVE RHEUMATOID FACTOR (HCC): ICD-10-CM

## 2022-06-29 DIAGNOSIS — I83.93 ASYMPTOMATIC VARICOSE VEINS OF BOTH LOWER EXTREMITIES: ICD-10-CM

## 2022-06-29 DIAGNOSIS — M15.9 PRIMARY OSTEOARTHRITIS INVOLVING MULTIPLE JOINTS: ICD-10-CM

## 2022-06-29 DIAGNOSIS — Z90.79 S/P TAH-BSO (TOTAL ABDOMINAL HYSTERECTOMY AND BILATERAL SALPINGO-OOPHORECTOMY): ICD-10-CM

## 2022-06-29 DIAGNOSIS — J30.1 SEASONAL ALLERGIC RHINITIS DUE TO POLLEN: ICD-10-CM

## 2022-06-29 DIAGNOSIS — Z11.59 NEED FOR HEPATITIS C SCREENING TEST: ICD-10-CM

## 2022-06-29 DIAGNOSIS — Z13.1 SCREENING FOR DIABETES MELLITUS (DM): ICD-10-CM

## 2022-06-29 DIAGNOSIS — Z90.722 S/P TAH-BSO (TOTAL ABDOMINAL HYSTERECTOMY AND BILATERAL SALPINGO-OOPHORECTOMY): ICD-10-CM

## 2022-06-29 DIAGNOSIS — N95.1 SYMPTOMATIC MENOPAUSAL OR FEMALE CLIMACTERIC STATES: ICD-10-CM

## 2022-06-29 DIAGNOSIS — G47.33 OBSTRUCTIVE SLEEP APNEA SYNDROME: ICD-10-CM

## 2022-06-29 DIAGNOSIS — R79.89 ELEVATED LFTS: ICD-10-CM

## 2022-06-29 PROBLEM — S61.411A LACERATION OF RIGHT HAND WITHOUT FOREIGN BODY: Status: RESOLVED | Noted: 2021-12-29 | Resolved: 2022-06-29

## 2022-06-29 PROBLEM — K52.9 ENTEROCOLITIS: Status: RESOLVED | Noted: 2021-07-05 | Resolved: 2022-06-29

## 2022-06-29 LAB
ALBUMIN SERPL-MCNC: 3.5 G/DL (ref 3.4–5)
ALBUMIN/GLOB SERPL: 0.8 {RATIO} (ref 1–2)
ALP LIVER SERPL-CCNC: 103 U/L
ALT SERPL-CCNC: 64 U/L
ANION GAP SERPL CALC-SCNC: 9 MMOL/L (ref 0–18)
AST SERPL-CCNC: 86 U/L (ref 15–37)
BASOPHILS # BLD AUTO: 0.09 X10(3) UL (ref 0–0.2)
BASOPHILS NFR BLD AUTO: 0.8 %
BILIRUB SERPL-MCNC: 0.4 MG/DL (ref 0.1–2)
BUN BLD-MCNC: 16 MG/DL (ref 7–18)
CALCIUM BLD-MCNC: 9.7 MG/DL (ref 8.5–10.1)
CHLORIDE SERPL-SCNC: 102 MMOL/L (ref 98–112)
CO2 SERPL-SCNC: 27 MMOL/L (ref 21–32)
CREAT BLD-MCNC: 1.38 MG/DL
CREAT UR-SCNC: 188 MG/DL
EOSINOPHIL # BLD AUTO: 0.15 X10(3) UL (ref 0–0.7)
EOSINOPHIL NFR BLD AUTO: 1.4 %
ERYTHROCYTE [DISTWIDTH] IN BLOOD BY AUTOMATED COUNT: 13.7 %
EST. AVERAGE GLUCOSE BLD GHB EST-MCNC: 203 MG/DL (ref 68–126)
FASTING STATUS PATIENT QL REPORTED: NO
GLOBULIN PLAS-MCNC: 4.4 G/DL (ref 2.8–4.4)
GLUCOSE BLD-MCNC: 266 MG/DL (ref 70–99)
HBA1C MFR BLD: 8.7 % (ref ?–5.7)
HCT VFR BLD AUTO: 46.8 %
HCV AB SERPL QL IA: NONREACTIVE
HGB BLD-MCNC: 14.6 G/DL
IMM GRANULOCYTES # BLD AUTO: 0.14 X10(3) UL (ref 0–1)
IMM GRANULOCYTES NFR BLD: 1.3 %
LYMPHOCYTES # BLD AUTO: 1.32 X10(3) UL (ref 1–4)
LYMPHOCYTES NFR BLD AUTO: 12.3 %
MCH RBC QN AUTO: 29.4 PG (ref 26–34)
MCHC RBC AUTO-ENTMCNC: 31.2 G/DL (ref 31–37)
MCV RBC AUTO: 94.2 FL
MICROALBUMIN UR-MCNC: 1.44 MG/DL
MICROALBUMIN/CREAT 24H UR-RTO: 7.7 UG/MG (ref ?–30)
MONOCYTES # BLD AUTO: 0.83 X10(3) UL (ref 0.1–1)
MONOCYTES NFR BLD AUTO: 7.7 %
NEUTROPHILS # BLD AUTO: 8.19 X10 (3) UL (ref 1.5–7.7)
NEUTROPHILS # BLD AUTO: 8.19 X10(3) UL (ref 1.5–7.7)
NEUTROPHILS NFR BLD AUTO: 76.5 %
OSMOLALITY SERPL CALC.SUM OF ELEC: 296 MOSM/KG (ref 275–295)
PLATELET # BLD AUTO: 211 10(3)UL (ref 150–450)
POTASSIUM SERPL-SCNC: 4.3 MMOL/L (ref 3.5–5.1)
PROT SERPL-MCNC: 7.9 G/DL (ref 6.4–8.2)
RBC # BLD AUTO: 4.97 X10(6)UL
SODIUM SERPL-SCNC: 138 MMOL/L (ref 136–145)
T4 FREE SERPL-MCNC: 1.1 NG/DL (ref 0.8–1.7)
TSI SER-ACNC: 5.91 MIU/ML (ref 0.36–3.74)
VIT D+METAB SERPL-MCNC: 47.2 NG/ML (ref 30–100)
WBC # BLD AUTO: 10.7 X10(3) UL (ref 4–11)

## 2022-06-29 PROCEDURE — 82306 VITAMIN D 25 HYDROXY: CPT

## 2022-06-29 PROCEDURE — 36415 COLL VENOUS BLD VENIPUNCTURE: CPT

## 2022-06-29 PROCEDURE — 86803 HEPATITIS C AB TEST: CPT

## 2022-06-29 PROCEDURE — 80053 COMPREHEN METABOLIC PANEL: CPT

## 2022-06-29 PROCEDURE — 84443 ASSAY THYROID STIM HORMONE: CPT

## 2022-06-29 PROCEDURE — G0439 PPPS, SUBSEQ VISIT: HCPCS | Performed by: FAMILY MEDICINE

## 2022-06-29 PROCEDURE — 83036 HEMOGLOBIN GLYCOSYLATED A1C: CPT

## 2022-06-29 PROCEDURE — 99214 OFFICE O/P EST MOD 30 MIN: CPT | Performed by: FAMILY MEDICINE

## 2022-06-29 PROCEDURE — 82043 UR ALBUMIN QUANTITATIVE: CPT

## 2022-06-29 PROCEDURE — 84439 ASSAY OF FREE THYROXINE: CPT

## 2022-06-29 PROCEDURE — 82570 ASSAY OF URINE CREATININE: CPT

## 2022-06-29 PROCEDURE — 85025 COMPLETE CBC W/AUTO DIFF WBC: CPT

## 2022-06-29 RX ORDER — ADALIMUMAB 40MG/0.4ML
40 KIT SUBCUTANEOUS
COMMUNITY
Start: 2022-04-20

## 2022-06-29 RX ORDER — ESTRADIOL 1 MG/1
1 TABLET ORAL DAILY
Qty: 90 TABLET | Refills: 3 | Status: SHIPPED | OUTPATIENT
Start: 2022-06-29

## 2022-06-29 RX ORDER — MONTELUKAST SODIUM 10 MG/1
10 TABLET ORAL DAILY
Qty: 90 TABLET | Refills: 3 | Status: SHIPPED | OUTPATIENT
Start: 2022-06-29

## 2022-06-29 RX ORDER — FLUTICASONE FUROATE AND VILANTEROL 200; 25 UG/1; UG/1
1 POWDER RESPIRATORY (INHALATION) DAILY
Qty: 3 EACH | Refills: 3 | Status: SHIPPED | OUTPATIENT
Start: 2022-06-29

## 2022-06-29 RX ORDER — DULOXETIN HYDROCHLORIDE 30 MG/1
30 CAPSULE, DELAYED RELEASE ORAL DAILY
Qty: 90 CAPSULE | Refills: 3 | Status: SHIPPED | OUTPATIENT
Start: 2022-06-29

## 2022-06-29 RX ORDER — SPIRONOLACTONE 50 MG/1
50 TABLET, FILM COATED ORAL DAILY
Qty: 90 TABLET | Refills: 3 | Status: SHIPPED | OUTPATIENT
Start: 2022-06-29

## 2022-06-29 RX ORDER — MIRTAZAPINE 30 MG/1
30 TABLET, FILM COATED ORAL NIGHTLY
Qty: 90 TABLET | Refills: 3 | Status: SHIPPED | OUTPATIENT
Start: 2022-06-29

## 2022-06-29 RX ORDER — ATORVASTATIN CALCIUM 20 MG/1
20 TABLET, FILM COATED ORAL NIGHTLY
Qty: 90 TABLET | Refills: 3 | Status: SHIPPED | OUTPATIENT
Start: 2022-06-29

## 2022-06-29 RX ORDER — ATOMOXETINE 60 MG/1
60 CAPSULE ORAL DAILY
Qty: 90 CAPSULE | Refills: 3 | Status: SHIPPED | OUTPATIENT
Start: 2022-06-29

## 2022-07-01 ENCOUNTER — TELEPHONE (OUTPATIENT)
Dept: FAMILY MEDICINE CLINIC | Facility: CLINIC | Age: 57
End: 2022-07-01

## 2022-07-01 NOTE — TELEPHONE ENCOUNTER
----- Message from Natalie Khalil MD sent at 7/1/2022  9:29 AM CDT -----  Good news and bad news on the lab work. The good news is that the thyroid is normal.  Hepatitis C test is negative. Urine microalbumin to creatinine ratio is normal which means that the diabetes is not causing any damage to the kidneys. Vitamin D level is normal at 47.2. CBC is normal.The bad news is that diabetes is confirmed. The hemoglobin A1c, the test that we used to follow the control of diabetes, is very elevated at 8.7. A normal hemoglobin A1c for someone who does not have diabetes is below 5.7. Diabetes that is well controlled would like the A1c to be below 7.0. This means that there definitely is diabetes and that we definitely need medication for it. Hopefully there are no side effects or problems with the metformin. Plan: Continue to take the same dose of metformin. Recheck in 3 months with blood work then.

## 2022-07-02 ENCOUNTER — PATIENT MESSAGE (OUTPATIENT)
Dept: FAMILY MEDICINE CLINIC | Facility: CLINIC | Age: 57
End: 2022-07-02

## 2022-07-05 NOTE — TELEPHONE ENCOUNTER
I apologize that I had not brought up the CT scan that was done in the emergency department. The CT scan showed a very nonspecific thickening in the bowel. This is not of any significance. It also showed a very small nodule in the lung that is not significant. These findings do not require any additional testing or follow-up. I apologize for not mentioning this at the time of your appointment.

## 2022-07-05 NOTE — TELEPHONE ENCOUNTER
From: Tony Wisdom  To: Jurgen Conrad MD  Sent: 7/2/2022 12:18 PM CDT  Subject: Ct scan/er follow up    We did not discuss the ct scan results at my visit. [facility] follow up with my [facility]. Do I need another appt for this? Or, can it wait til my appt in October?

## 2022-09-10 ENCOUNTER — LABORATORY ENCOUNTER (OUTPATIENT)
Dept: LAB | Age: 57
End: 2022-09-10
Attending: FAMILY MEDICINE
Payer: MEDICARE

## 2022-09-10 DIAGNOSIS — E11.9 CONTROLLED TYPE 2 DIABETES MELLITUS WITHOUT COMPLICATION, WITHOUT LONG-TERM CURRENT USE OF INSULIN (HCC): ICD-10-CM

## 2022-09-10 LAB
ALBUMIN SERPL-MCNC: 3.5 G/DL (ref 3.4–5)
ALBUMIN/GLOB SERPL: 0.9 {RATIO} (ref 1–2)
ALP LIVER SERPL-CCNC: 87 U/L
ALT SERPL-CCNC: 49 U/L
ANION GAP SERPL CALC-SCNC: 10 MMOL/L (ref 0–18)
AST SERPL-CCNC: 54 U/L (ref 15–37)
BILIRUB SERPL-MCNC: 0.5 MG/DL (ref 0.1–2)
BUN BLD-MCNC: 15 MG/DL (ref 7–18)
CALCIUM BLD-MCNC: 9.5 MG/DL (ref 8.5–10.1)
CHLORIDE SERPL-SCNC: 104 MMOL/L (ref 98–112)
CO2 SERPL-SCNC: 23 MMOL/L (ref 21–32)
CREAT BLD-MCNC: 1.16 MG/DL
EST. AVERAGE GLUCOSE BLD GHB EST-MCNC: 214 MG/DL (ref 68–126)
FASTING STATUS PATIENT QL REPORTED: YES
GFR SERPLBLD BASED ON 1.73 SQ M-ARVRAT: 55 ML/MIN/1.73M2 (ref 60–?)
GLOBULIN PLAS-MCNC: 4.1 G/DL (ref 2.8–4.4)
GLUCOSE BLD-MCNC: 220 MG/DL (ref 70–99)
HBA1C MFR BLD: 9.1 % (ref ?–5.7)
OSMOLALITY SERPL CALC.SUM OF ELEC: 292 MOSM/KG (ref 275–295)
POTASSIUM SERPL-SCNC: 3.9 MMOL/L (ref 3.5–5.1)
PROT SERPL-MCNC: 7.6 G/DL (ref 6.4–8.2)
SODIUM SERPL-SCNC: 137 MMOL/L (ref 136–145)

## 2022-09-10 PROCEDURE — 36415 COLL VENOUS BLD VENIPUNCTURE: CPT

## 2022-09-10 PROCEDURE — 80053 COMPREHEN METABOLIC PANEL: CPT

## 2022-09-10 PROCEDURE — 83036 HEMOGLOBIN GLYCOSYLATED A1C: CPT

## 2022-09-10 NOTE — TELEPHONE ENCOUNTER
Esopmeprazole: 6/29/22    Future appt: Your appointments     Date & Time Appointment Department [facility])    Sep 10, 2022 10:15 AM CDT Laboratory Visit with REF Red Barges Reference Lab ([facility] Ref [facility] [place])        Sep 14, 2022  3:00 PM CDT Follow Up Visit with Ramo Aguilar MD 25 [facility] ([facility])        Sep 28, 2022  3:30 PM CDT Sleep Follow Up with Caprice Kaplan, 909 [facility] ([facility])            25 [facility] [place]  [place] [facility] [phone]  [address] Ref Lab [place]  [address] [phone]  [phone]        Last Appointment with provider:   6/29/2022  Last appointment at [facility] [place]:  6/29/2022  Cholesterol, Total (mg/dL)   Date Value   06/09/2018 163     HDL Cholesterol (mg/dL)   Date Value   06/09/2018 63     LDL Cholesterol (mg/dL)   Date Value   06/09/2018 81     Triglycerides (mg/dL)   Date Value   06/09/2018 94     Lab Results   Component Value Date     (H) 06/29/2022    A1C 8.7 (H) 06/29/2022     Lab Results   Component Value Date    T4F 1.1 06/29/2022    TSH 5.910 (H) 06/29/2022       No follow-ups on file.

## 2022-09-14 ENCOUNTER — OFFICE VISIT (OUTPATIENT)
Dept: FAMILY MEDICINE CLINIC | Facility: CLINIC | Age: 57
End: 2022-09-14
Payer: MEDICARE

## 2022-09-14 VITALS
RESPIRATION RATE: 20 BRPM | HEART RATE: 100 BPM | WEIGHT: 293 LBS | TEMPERATURE: 98 F | BODY MASS INDEX: 51.27 KG/M2 | DIASTOLIC BLOOD PRESSURE: 64 MMHG | HEIGHT: 63.25 IN | SYSTOLIC BLOOD PRESSURE: 120 MMHG

## 2022-09-14 DIAGNOSIS — M05.79 RHEUMATOID ARTHRITIS INVOLVING MULTIPLE SITES WITH POSITIVE RHEUMATOID FACTOR (HCC): ICD-10-CM

## 2022-09-14 DIAGNOSIS — E11.9 CONTROLLED TYPE 2 DIABETES MELLITUS WITHOUT COMPLICATION, WITHOUT LONG-TERM CURRENT USE OF INSULIN (HCC): Primary | ICD-10-CM

## 2022-09-14 PROCEDURE — 99214 OFFICE O/P EST MOD 30 MIN: CPT | Performed by: FAMILY MEDICINE

## 2022-09-14 RX ORDER — DOXYCYCLINE HYCLATE 100 MG/1
100 CAPSULE ORAL 2 TIMES DAILY
COMMUNITY
Start: 2022-09-12 | End: 2022-09-19

## 2022-09-14 RX ORDER — METRONIDAZOLE 500 MG/1
1 TABLET ORAL 2 TIMES DAILY
COMMUNITY
Start: 2022-09-12 | End: 2022-09-19

## 2022-09-14 RX ORDER — SOLIFENACIN SUCCINATE 10 MG/1
1 TABLET, FILM COATED ORAL DAILY
COMMUNITY
Start: 2022-07-12

## 2022-09-14 NOTE — PATIENT INSTRUCTIONS
Start Ozempic 0.25 mg once weekly. Call or send a [employer] message in 4 weeks with progress; we will probably increase the dose to 0.5 mg then. Recheck in 3 months.

## 2022-09-27 ENCOUNTER — OFFICE VISIT (OUTPATIENT)
Dept: FAMILY MEDICINE CLINIC | Facility: CLINIC | Age: 57
End: 2022-09-27

## 2022-09-27 VITALS
HEIGHT: 63.25 IN | OXYGEN SATURATION: 96 % | BODY MASS INDEX: 51.27 KG/M2 | WEIGHT: 293 LBS | TEMPERATURE: 99 F | HEART RATE: 83 BPM | SYSTOLIC BLOOD PRESSURE: 114 MMHG | RESPIRATION RATE: 24 BRPM | DIASTOLIC BLOOD PRESSURE: 60 MMHG

## 2022-09-27 DIAGNOSIS — M54.16 LUMBAR RADICULOPATHY: ICD-10-CM

## 2022-09-27 DIAGNOSIS — R30.0 DYSURIA: Primary | ICD-10-CM

## 2022-09-27 LAB
BILIRUB UR QL STRIP.AUTO: NEGATIVE
BILIRUBIN: NEGATIVE
COLOR UR AUTO: YELLOW
GLUCOSE (URINE DIPSTICK): NEGATIVE MG/DL
GLUCOSE UR STRIP.AUTO-MCNC: NEGATIVE MG/DL
KETONES (URINE DIPSTICK): NEGATIVE MG/DL
KETONES UR STRIP.AUTO-MCNC: NEGATIVE MG/DL
MULTISTIX LOT#: ABNORMAL NUMERIC
NITRITE UR QL STRIP.AUTO: NEGATIVE
NITRITE, URINE: NEGATIVE
PH UR STRIP.AUTO: 5 [PH] (ref 5–8)
PH, URINE: 6 (ref 4.5–8)
PROT UR STRIP.AUTO-MCNC: 100 MG/DL
PROTEIN (URINE DIPSTICK): >=300 MG/DL
RBC #/AREA URNS AUTO: >10 /HPF
SP GR UR STRIP.AUTO: 1.02 (ref 1–1.03)
SPECIFIC GRAVITY: >=1.03 (ref 1–1.03)
URINE-COLOR: YELLOW
UROBILINOGEN UR STRIP.AUTO-MCNC: <2 MG/DL
UROBILINOGEN,SEMI-QN: 0.2 MG/DL (ref 0–1.9)
WBC #/AREA URNS AUTO: >50 /HPF

## 2022-09-27 PROCEDURE — 87086 URINE CULTURE/COLONY COUNT: CPT | Performed by: NURSE PRACTITIONER

## 2022-09-27 PROCEDURE — 87184 SC STD DISK METHOD PER PLATE: CPT | Performed by: NURSE PRACTITIONER

## 2022-09-27 PROCEDURE — 87088 URINE BACTERIA CULTURE: CPT | Performed by: NURSE PRACTITIONER

## 2022-09-27 PROCEDURE — 87186 SC STD MICRODIL/AGAR DIL: CPT | Performed by: NURSE PRACTITIONER

## 2022-09-27 PROCEDURE — 81003 URINALYSIS AUTO W/O SCOPE: CPT | Performed by: NURSE PRACTITIONER

## 2022-09-27 PROCEDURE — 99214 OFFICE O/P EST MOD 30 MIN: CPT | Performed by: NURSE PRACTITIONER

## 2022-09-27 PROCEDURE — 81001 URINALYSIS AUTO W/SCOPE: CPT | Performed by: NURSE PRACTITIONER

## 2022-09-27 PROCEDURE — 87077 CULTURE AEROBIC IDENTIFY: CPT | Performed by: NURSE PRACTITIONER

## 2022-09-27 RX ORDER — TRAMADOL HYDROCHLORIDE 50 MG/1
1 TABLET ORAL EVERY 6 HOURS PRN
COMMUNITY
Start: 2022-09-22

## 2022-09-27 RX ORDER — TIZANIDINE 4 MG/1
1 TABLET ORAL 3 TIMES DAILY PRN
COMMUNITY
Start: 2022-09-22

## 2022-09-27 NOTE — PATIENT INSTRUCTIONS
Repeat Urine specimen today  Call Urology office with update on symptoms and recent UTI since being off suppressive antibiotics; review taking the estrogen cream routinely  Increase fluids, timed urination every 2 hours while awake   If worsening or a fever, ER; May need IV antibiotic therapy    Physical Therapy for hip and low back pain  Use rolling walker  If no significant improvement in the next 2-4 weeks then Ortho Spine

## 2022-09-28 ENCOUNTER — TELEPHONE (OUTPATIENT)
Dept: FAMILY MEDICINE CLINIC | Facility: CLINIC | Age: 57
End: 2022-09-28

## 2022-09-28 ENCOUNTER — OFFICE VISIT (OUTPATIENT)
Dept: FAMILY MEDICINE CLINIC | Facility: CLINIC | Age: 57
End: 2022-09-28
Payer: MEDICARE

## 2022-09-28 VITALS
RESPIRATION RATE: 18 BRPM | SYSTOLIC BLOOD PRESSURE: 132 MMHG | HEIGHT: 63.25 IN | OXYGEN SATURATION: 96 % | BODY MASS INDEX: 51.27 KG/M2 | DIASTOLIC BLOOD PRESSURE: 80 MMHG | HEART RATE: 84 BPM | WEIGHT: 293 LBS | TEMPERATURE: 97 F

## 2022-09-28 DIAGNOSIS — B37.0 THRUSH: ICD-10-CM

## 2022-09-28 DIAGNOSIS — G47.33 OBSTRUCTIVE SLEEP APNEA SYNDROME: Primary | ICD-10-CM

## 2022-09-28 PROCEDURE — 99214 OFFICE O/P EST MOD 30 MIN: CPT | Performed by: NURSE PRACTITIONER

## 2022-09-28 RX ORDER — CLOTRIMAZOLE 10 MG/1
10 LOZENGE ORAL; TOPICAL
Qty: 35 LOZENGE | Refills: 0 | Status: SHIPPED | OUTPATIENT
Start: 2022-09-28 | End: 2022-10-05

## 2022-09-28 NOTE — TELEPHONE ENCOUNTER
----- Message from DEBRA Tavarez sent at 9/28/2022  8:06 AM CDT -----  Please call patient. Greater than 50 WBCs in urine, reflexed to culture. Few skin cells present. Await full culture. Start cefdinir 300mg twice a day for ten days; if symptoms worsen then ER as limited oral antibiotic options outpatient with multiple allergies. Ensure call urology today if didn't do yesterday.

## 2022-10-03 ENCOUNTER — TELEPHONE (OUTPATIENT)
Dept: FAMILY MEDICINE CLINIC | Facility: CLINIC | Age: 57
End: 2022-10-03

## 2022-10-03 DIAGNOSIS — R30.0 DYSURIA: Primary | ICD-10-CM

## 2022-10-03 DIAGNOSIS — A49.9 INFECTION DUE TO BACTERIA RESISTANT TO MULTIPLE ANTIMICROBIAL DRUGS: ICD-10-CM

## 2022-10-03 DIAGNOSIS — N30.01 ACUTE CYSTITIS WITH HEMATURIA: ICD-10-CM

## 2022-10-03 DIAGNOSIS — Z16.35 INFECTION DUE TO BACTERIA RESISTANT TO MULTIPLE ANTIMICROBIAL DRUGS: ICD-10-CM

## 2022-10-03 NOTE — TELEPHONE ENCOUNTER
Care everywhere reviewed. Results of most recent urine culture from when office last week 9/27/22 prior to ER call placed to [facility] Chandan Call[facility] the [facility] for their viewing. Results also faxed to Dr. Handy MOLINA (fax: [phone]) as was consulted from the ER when went on 10/01/2022. New consult also placed for the patient as I believe it would be a good idea to help with coordinating patient's recurring UTIs and multiple medication allergies.

## 2022-10-03 NOTE — TELEPHONE ENCOUNTER
----- Message from DEBRA Colbert sent at 10/3/2022  7:49 AM CDT -----  See other note, please call patient.

## 2022-10-03 NOTE — TELEPHONE ENCOUNTER
Patient informed of below. Expressed understanding. 's phone number given to patient. Patient will be going to [facility] for  IV antibiotics per Urology.   Fidel Vickers CMA, 10/03/22, 4:10 PM

## 2022-10-03 NOTE — TELEPHONE ENCOUNTER
Patient states she is not feeling well yet. Has had 2 days IV antibiotics. Will be going back again today for IV antibiotics. Waiting for an appointment time. Patient states orders are coming from   [address] [place] [address].

## 2022-10-09 ENCOUNTER — PATIENT MESSAGE (OUTPATIENT)
Dept: FAMILY MEDICINE CLINIC | Facility: CLINIC | Age: 57
End: 2022-10-09

## 2022-10-10 NOTE — TELEPHONE ENCOUNTER
Very good news. I am thankful that the Ozempic is going well. Plan: Increase Ozempic to 0.5 mg now. Send us a report in 1 month with progress at this dose. I will send a revised prescription to the pharmacy.

## 2022-10-10 NOTE — TELEPHONE ENCOUNTER
From: Tony Milianakash  To: Rhonda Gracia MD  Sent: 10/9/2022 5:06 PM CDT  Subject: Ozempic     It has been 4 weeks and I have tolerated ok. We can increase to the .5 dose now. Do I need a different prescription?  Thanks

## 2022-10-16 ENCOUNTER — PATIENT MESSAGE (OUTPATIENT)
Dept: FAMILY MEDICINE CLINIC | Facility: CLINIC | Age: 57
End: 2022-10-16

## 2022-10-17 ENCOUNTER — PATIENT MESSAGE (OUTPATIENT)
Dept: FAMILY MEDICINE CLINIC | Facility: CLINIC | Age: 57
End: 2022-10-17

## 2022-10-17 NOTE — TELEPHONE ENCOUNTER
From: Raman Wisdom  To: Kathie Ko MD  Sent: 10/16/2022 11:55 AM CDT  Subject: Prior approval     My prior approval for Esomeprazole magnesium is expiring. Will you please call to renew? [phone] (clinical call center)  Also, I brought my handicap placard renewal to my last visit, but haven't received it yet. Was it mailed, or do I need to pick it up?   Thanks

## 2022-10-17 NOTE — TELEPHONE ENCOUNTER
From: Mora Wisdom  To: Jerry Johnson MD  Sent: 10/16/2022 11:55 AM CDT  Subject: Prior approval     My prior approval for Esomeprazole magnesium is expiring. Will you please call to renew? [phone] (clinical call center)  Also, I brought my handicap placard renewal to my last visit, but haven't received it yet. Was it mailed, or do I need to pick it up?   Thanks

## 2022-10-25 ENCOUNTER — TELEPHONE (OUTPATIENT)
Dept: FAMILY MEDICINE CLINIC | Facility: CLINIC | Age: 57
End: 2022-10-25

## 2022-10-25 NOTE — TELEPHONE ENCOUNTER
Informed Manuela RICHARDSON of patient's current  Urinary symptoms. Per Mary Ann Bower-  Patient to call her [place] Urologist to discuss  Plan for treatment/Patient agrees. She will call Urology.

## 2022-10-25 NOTE — TELEPHONE ENCOUNTER
Pt calling and asking to be seen today for a possible UTI. She has pain/burning when urinating, frequency and urge to go. No appt's available and pt asking to be accommodated. Please advise.

## 2022-11-08 ENCOUNTER — OFFICE VISIT (OUTPATIENT)
Dept: FAMILY MEDICINE CLINIC | Facility: CLINIC | Age: 57
End: 2022-11-08
Payer: MEDICARE

## 2022-11-08 VITALS
HEIGHT: 63.25 IN | HEART RATE: 102 BPM | WEIGHT: 293 LBS | DIASTOLIC BLOOD PRESSURE: 74 MMHG | RESPIRATION RATE: 20 BRPM | SYSTOLIC BLOOD PRESSURE: 126 MMHG | OXYGEN SATURATION: 96 % | BODY MASS INDEX: 51.27 KG/M2 | TEMPERATURE: 97 F

## 2022-11-08 DIAGNOSIS — N39.0 FREQUENT UTI: Primary | ICD-10-CM

## 2022-11-08 DIAGNOSIS — M05.79 RHEUMATOID ARTHRITIS INVOLVING MULTIPLE SITES WITH POSITIVE RHEUMATOID FACTOR (HCC): ICD-10-CM

## 2022-11-08 DIAGNOSIS — J45.40 MODERATE PERSISTENT ASTHMA WITHOUT COMPLICATION: ICD-10-CM

## 2022-11-08 DIAGNOSIS — E66.01 CLASS 3 SEVERE OBESITY DUE TO EXCESS CALORIES WITH SERIOUS COMORBIDITY AND BODY MASS INDEX (BMI) OF 60.0 TO 69.9 IN ADULT (HCC): ICD-10-CM

## 2022-11-08 PROCEDURE — 99214 OFFICE O/P EST MOD 30 MIN: CPT | Performed by: FAMILY MEDICINE

## 2022-11-08 RX ORDER — CHOLECALCIFEROL (VITAMIN D3) 125 MCG
CAPSULE ORAL AS DIRECTED
COMMUNITY

## 2022-11-08 NOTE — PATIENT INSTRUCTIONS
Increase activity, especially walking. Drink lots of water during the day. Take Cranberry daily to prevent UTI's.

## 2022-11-14 ENCOUNTER — TELEPHONE (OUTPATIENT)
Dept: FAMILY MEDICINE CLINIC | Facility: CLINIC | Age: 57
End: 2022-11-14

## 2022-11-14 DIAGNOSIS — R30.0 DYSURIA: Primary | ICD-10-CM

## 2022-11-14 NOTE — TELEPHONE ENCOUNTER
[facility] patient  C/O UTI Sx. States patient has:  Pain w/urination/Burning/Urgency/Brown colored  Urine. Patient increased Cranberry Capsules to 3 daily. Please advise.

## 2022-11-14 NOTE — TELEPHONE ENCOUNTER
Wing Galvin with [facility] informed of the below recommendations. States patient had already urinated when she got there but she did give her supplies and asked that she call her once she was able to provide a sample.

## 2022-11-15 ENCOUNTER — TELEPHONE (OUTPATIENT)
Dept: FAMILY MEDICINE CLINIC | Facility: CLINIC | Age: 57
End: 2022-11-15

## 2022-11-15 DIAGNOSIS — Z16.12 ESBL (EXTENDED SPECTRUM BETA-LACTAMASE) PRODUCING BACTERIA INFECTION: Primary | ICD-10-CM

## 2022-11-15 DIAGNOSIS — A49.9 ESBL (EXTENDED SPECTRUM BETA-LACTAMASE) PRODUCING BACTERIA INFECTION: Primary | ICD-10-CM

## 2022-11-15 NOTE — TELEPHONE ENCOUNTER
The urinalysis is abnormal and similar to the one that she had when she was in the hospital.    However, the dilemma is that there are no oral antibiotic options available. The only acceptable treatment now is IV antibiotics that cannot be given by mouth. Several recommendations:  First, please call and schedule an appointment with the infectious disease specialist to see if there is another type of treatment available as an outpatient in the future. Secondly, continue to drink large quantities of water and take Azo cranberry to try to make the urine is acidic is possible. Thirdly, if fever develops we will recommend going to the emergency department at [facility] for treatment. Urine culture is pending at this time.

## 2022-11-15 NOTE — TELEPHONE ENCOUNTER
Spoke to Stanislav at [facility] verbalized understanding of recommendations.     No further questions

## 2022-11-15 NOTE — TELEPHONE ENCOUNTER
Valeria Presser with [facility] calling to make sure PCP sees there urine results in care everywhere. Please advise on what pt should do and c/b.

## 2022-11-22 ENCOUNTER — TELEPHONE (OUTPATIENT)
Dept: FAMILY MEDICINE CLINIC | Facility: CLINIC | Age: 57
End: 2022-11-22

## 2022-11-22 NOTE — TELEPHONE ENCOUNTER
Called and spoke with Mikaela Davila.  She reported that she did see Dr. Kennedy Currie in consultation. She is taking the Cipro now and has not had any reaction to it. She is waiting to receive the final report on the urine culture.

## 2022-12-10 ENCOUNTER — LABORATORY ENCOUNTER (OUTPATIENT)
Dept: LAB | Age: 57
End: 2022-12-10
Attending: FAMILY MEDICINE
Payer: MEDICARE

## 2022-12-10 DIAGNOSIS — E11.9 CONTROLLED TYPE 2 DIABETES MELLITUS WITHOUT COMPLICATION, WITHOUT LONG-TERM CURRENT USE OF INSULIN (HCC): ICD-10-CM

## 2022-12-10 LAB
ALBUMIN SERPL-MCNC: 3.5 G/DL (ref 3.4–5)
ALBUMIN/GLOB SERPL: 0.9 {RATIO} (ref 1–2)
ALP LIVER SERPL-CCNC: 83 U/L
ALT SERPL-CCNC: 48 U/L
ANION GAP SERPL CALC-SCNC: 9 MMOL/L (ref 0–18)
AST SERPL-CCNC: 37 U/L (ref 15–37)
BILIRUB SERPL-MCNC: 0.4 MG/DL (ref 0.1–2)
BUN BLD-MCNC: 15 MG/DL (ref 7–18)
CALCIUM BLD-MCNC: 9.3 MG/DL (ref 8.5–10.1)
CHLORIDE SERPL-SCNC: 107 MMOL/L (ref 98–112)
CO2 SERPL-SCNC: 22 MMOL/L (ref 21–32)
CREAT BLD-MCNC: 1.17 MG/DL
EST. AVERAGE GLUCOSE BLD GHB EST-MCNC: 200 MG/DL (ref 68–126)
FASTING STATUS PATIENT QL REPORTED: YES
GFR SERPLBLD BASED ON 1.73 SQ M-ARVRAT: 54 ML/MIN/1.73M2 (ref 60–?)
GLOBULIN PLAS-MCNC: 3.9 G/DL (ref 2.8–4.4)
GLUCOSE BLD-MCNC: 211 MG/DL (ref 70–99)
HBA1C MFR BLD: 8.6 % (ref ?–5.7)
OSMOLALITY SERPL CALC.SUM OF ELEC: 293 MOSM/KG (ref 275–295)
POTASSIUM SERPL-SCNC: 4 MMOL/L (ref 3.5–5.1)
PROT SERPL-MCNC: 7.4 G/DL (ref 6.4–8.2)
SODIUM SERPL-SCNC: 138 MMOL/L (ref 136–145)

## 2022-12-10 PROCEDURE — 36415 COLL VENOUS BLD VENIPUNCTURE: CPT

## 2022-12-10 PROCEDURE — 80053 COMPREHEN METABOLIC PANEL: CPT

## 2022-12-10 PROCEDURE — 83036 HEMOGLOBIN GLYCOSYLATED A1C: CPT

## 2022-12-12 RX ORDER — DULOXETIN HYDROCHLORIDE 30 MG/1
CAPSULE, DELAYED RELEASE ORAL
Qty: 90 CAPSULE | Refills: 3 | Status: SHIPPED | OUTPATIENT
Start: 2022-12-12

## 2022-12-12 NOTE — TELEPHONE ENCOUNTER
Duloxetine: 6/29/22    Future appt: Your appointments     Date & Time Appointment Department [facility])    Dec 13, 2022  2:15 PM CST Follow up - Extended with Jere Gregory MD 25 [facility] [place] ([facility])        Mar 28, 2023  3:15 PM CDT Sleep Follow Up with 55 [facility], [address] [place] [address], [address], [place] [facility])            25 [facility] [phone]  [phone]        Last Appointment with provider:   11/8/2022  Last appointment at [facility]:  11/8/2022  Cholesterol, Total (mg/dL)   Date Value   06/09/2018 163     HDL Cholesterol (mg/dL)   Date Value   06/09/2018 63     LDL Cholesterol (mg/dL)   Date Value   06/09/2018 81     Triglycerides (mg/dL)   Date Value   06/09/2018 94     Lab Results   Component Value Date     (H) 12/10/2022    A1C 8.6 (H) 12/10/2022     Lab Results   Component Value Date    T4F 1.1 06/29/2022    TSH 5.910 (H) 06/29/2022       No follow-ups on file.

## 2022-12-13 ENCOUNTER — OFFICE VISIT (OUTPATIENT)
Dept: FAMILY MEDICINE CLINIC | Facility: CLINIC | Age: 57
End: 2022-12-13
Payer: MEDICARE

## 2022-12-13 VITALS
BODY MASS INDEX: 51.27 KG/M2 | HEART RATE: 93 BPM | HEIGHT: 63.25 IN | RESPIRATION RATE: 20 BRPM | OXYGEN SATURATION: 97 % | DIASTOLIC BLOOD PRESSURE: 72 MMHG | TEMPERATURE: 97 F | WEIGHT: 293 LBS | SYSTOLIC BLOOD PRESSURE: 126 MMHG

## 2022-12-13 DIAGNOSIS — F33.1 MODERATE EPISODE OF RECURRENT MAJOR DEPRESSIVE DISORDER (HCC): ICD-10-CM

## 2022-12-13 DIAGNOSIS — E78.49 FAMILIAL MULTIPLE LIPOPROTEIN-TYPE HYPERLIPIDEMIA: ICD-10-CM

## 2022-12-13 DIAGNOSIS — J45.40 MODERATE PERSISTENT ASTHMA WITHOUT COMPLICATION: ICD-10-CM

## 2022-12-13 DIAGNOSIS — I83.93 ASYMPTOMATIC VARICOSE VEINS OF BOTH LOWER EXTREMITIES: ICD-10-CM

## 2022-12-13 DIAGNOSIS — E11.9 CONTROLLED TYPE 2 DIABETES MELLITUS WITHOUT COMPLICATION, WITHOUT LONG-TERM CURRENT USE OF INSULIN (HCC): Primary | ICD-10-CM

## 2022-12-13 PROCEDURE — 99214 OFFICE O/P EST MOD 30 MIN: CPT | Performed by: FAMILY MEDICINE

## 2023-03-11 ENCOUNTER — LABORATORY ENCOUNTER (OUTPATIENT)
Dept: LAB | Age: 58
End: 2023-03-11
Attending: FAMILY MEDICINE
Payer: MEDICARE

## 2023-03-11 DIAGNOSIS — E11.9 CONTROLLED TYPE 2 DIABETES MELLITUS WITHOUT COMPLICATION, WITHOUT LONG-TERM CURRENT USE OF INSULIN (HCC): ICD-10-CM

## 2023-03-11 LAB
ALBUMIN SERPL-MCNC: 3.5 G/DL (ref 3.4–5)
ALBUMIN/GLOB SERPL: 0.9 {RATIO} (ref 1–2)
ALP LIVER SERPL-CCNC: 81 U/L
ALT SERPL-CCNC: 46 U/L
ANION GAP SERPL CALC-SCNC: 10 MMOL/L (ref 0–18)
AST SERPL-CCNC: 37 U/L (ref 15–37)
BILIRUB SERPL-MCNC: 0.3 MG/DL (ref 0.1–2)
BUN BLD-MCNC: 21 MG/DL (ref 7–18)
CALCIUM BLD-MCNC: 9.4 MG/DL (ref 8.5–10.1)
CHLORIDE SERPL-SCNC: 105 MMOL/L (ref 98–112)
CO2 SERPL-SCNC: 23 MMOL/L (ref 21–32)
CREAT BLD-MCNC: 1.25 MG/DL
EST. AVERAGE GLUCOSE BLD GHB EST-MCNC: 180 MG/DL (ref 68–126)
FASTING STATUS PATIENT QL REPORTED: YES
GFR SERPLBLD BASED ON 1.73 SQ M-ARVRAT: 50 ML/MIN/1.73M2 (ref 60–?)
GLOBULIN PLAS-MCNC: 4 G/DL (ref 2.8–4.4)
GLUCOSE BLD-MCNC: 211 MG/DL (ref 70–99)
HBA1C MFR BLD: 7.9 % (ref ?–5.7)
OSMOLALITY SERPL CALC.SUM OF ELEC: 295 MOSM/KG (ref 275–295)
POTASSIUM SERPL-SCNC: 4.4 MMOL/L (ref 3.5–5.1)
PROT SERPL-MCNC: 7.5 G/DL (ref 6.4–8.2)
SODIUM SERPL-SCNC: 138 MMOL/L (ref 136–145)

## 2023-03-11 PROCEDURE — 36415 COLL VENOUS BLD VENIPUNCTURE: CPT

## 2023-03-11 PROCEDURE — 80053 COMPREHEN METABOLIC PANEL: CPT

## 2023-03-11 PROCEDURE — 83036 HEMOGLOBIN GLYCOSYLATED A1C: CPT

## 2023-03-28 ENCOUNTER — OFFICE VISIT (OUTPATIENT)
Dept: FAMILY MEDICINE CLINIC | Facility: CLINIC | Age: 58
End: 2023-03-28
Payer: MEDICARE

## 2023-03-28 VITALS
RESPIRATION RATE: 20 BRPM | DIASTOLIC BLOOD PRESSURE: 58 MMHG | TEMPERATURE: 97 F | BODY MASS INDEX: 51.27 KG/M2 | HEIGHT: 63.25 IN | SYSTOLIC BLOOD PRESSURE: 112 MMHG | OXYGEN SATURATION: 94 % | WEIGHT: 293 LBS | HEART RATE: 101 BPM

## 2023-03-28 DIAGNOSIS — G47.61 PLMD (PERIODIC LIMB MOVEMENT DISORDER): ICD-10-CM

## 2023-03-28 DIAGNOSIS — E66.01 CLASS 3 SEVERE OBESITY DUE TO EXCESS CALORIES WITH SERIOUS COMORBIDITY AND BODY MASS INDEX (BMI) OF 60.0 TO 69.9 IN ADULT (HCC): ICD-10-CM

## 2023-03-28 DIAGNOSIS — E08.22 DIABETES MELLITUS DUE TO UNDERLYING CONDITION WITH CHRONIC KIDNEY DISEASE, WITHOUT LONG-TERM CURRENT USE OF INSULIN, UNSPECIFIED CKD STAGE (HCC): ICD-10-CM

## 2023-03-28 DIAGNOSIS — G47.33 OBSTRUCTIVE SLEEP APNEA SYNDROME: Primary | ICD-10-CM

## 2023-03-28 DIAGNOSIS — R79.9 ABNORMAL FINDING OF BLOOD CHEMISTRY, UNSPECIFIED: ICD-10-CM

## 2023-03-28 DIAGNOSIS — G25.81 RLS (RESTLESS LEGS SYNDROME): ICD-10-CM

## 2023-03-28 PROCEDURE — 99214 OFFICE O/P EST MOD 30 MIN: CPT | Performed by: FAMILY MEDICINE

## 2023-03-28 RX ORDER — KETOROLAC TROMETHAMINE 10 MG/1
10 TABLET, FILM COATED ORAL EVERY 6 HOURS PRN
COMMUNITY
Start: 2023-02-10

## 2023-03-31 ENCOUNTER — OFFICE VISIT (OUTPATIENT)
Dept: FAMILY MEDICINE CLINIC | Facility: CLINIC | Age: 58
End: 2023-03-31
Payer: MEDICARE

## 2023-03-31 VITALS
WEIGHT: 293 LBS | TEMPERATURE: 97 F | HEIGHT: 63.25 IN | OXYGEN SATURATION: 96 % | DIASTOLIC BLOOD PRESSURE: 70 MMHG | RESPIRATION RATE: 20 BRPM | HEART RATE: 97 BPM | BODY MASS INDEX: 51.27 KG/M2 | SYSTOLIC BLOOD PRESSURE: 132 MMHG

## 2023-03-31 DIAGNOSIS — E66.01 CLASS 3 SEVERE OBESITY DUE TO EXCESS CALORIES WITH SERIOUS COMORBIDITY AND BODY MASS INDEX (BMI) OF 50.0 TO 59.9 IN ADULT (HCC): ICD-10-CM

## 2023-03-31 DIAGNOSIS — E11.9 CONTROLLED TYPE 2 DIABETES MELLITUS WITHOUT COMPLICATION, WITHOUT LONG-TERM CURRENT USE OF INSULIN (HCC): Primary | ICD-10-CM

## 2023-03-31 PROBLEM — R79.89 ELEVATED LFTS: Status: RESOLVED | Noted: 2021-05-12 | Resolved: 2023-03-31

## 2023-03-31 PROCEDURE — 99214 OFFICE O/P EST MOD 30 MIN: CPT | Performed by: FAMILY MEDICINE

## 2023-03-31 RX ORDER — ALBUTEROL SULFATE 90 UG/1
2 AEROSOL, METERED RESPIRATORY (INHALATION) EVERY 4 HOURS PRN
Qty: 3 EACH | Refills: 1 | Status: SHIPPED | OUTPATIENT
Start: 2023-03-31

## 2023-06-19 RX ORDER — ATORVASTATIN CALCIUM 20 MG/1
TABLET, FILM COATED ORAL
Qty: 90 TABLET | Refills: 1 | Status: SHIPPED | OUTPATIENT
Start: 2023-06-19

## 2023-06-19 RX ORDER — ATOMOXETINE 60 MG/1
CAPSULE ORAL
Qty: 90 CAPSULE | Refills: 1 | Status: SHIPPED | OUTPATIENT
Start: 2023-06-19

## 2023-06-19 NOTE — TELEPHONE ENCOUNTER
Atomoxetine/Atorvastatin: 6/29/22  Future appt: Your appointments     Date & Time Appointment Department [facility])    Aug 12, 2023  8:15 AM CDT Laboratory Visit with REF 1 Megan Raymundo Mario Lindquist [place] ([facility] [place])        Sep 08, 2023  3:00 PM CDT Medicare Annual Well Visit with MD Rossy Phillip [place] ([facility])        Sep 28, 2023  3:00 PM CDT Sleep Follow Up with MD Rossy Castro [place] ([facility])            Mario Hernandes [place]  EDW Ref [facility] [place]  69 [facility] [address] [place]  [address] [address] [phone]  [phone]        Last Appointment with provider:   3/31/2023  Last appointment at [facility] [place]:  3/31/2023  Cholesterol, Total (mg/dL)   Date Value   06/09/2018 163     HDL Cholesterol (mg/dL)   Date Value   06/09/2018 63     LDL Cholesterol (mg/dL)   Date Value   06/09/2018 81     Triglycerides (mg/dL)   Date Value   06/09/2018 94     Lab Results   Component Value Date     (H) 03/11/2023    A1C 7.9 (H) 03/11/2023     Lab Results   Component Value Date    T4F 1.1 06/29/2022    TSH 5.910 (H) 06/29/2022       No follow-ups on file.

## 2023-06-20 ENCOUNTER — OFFICE VISIT (OUTPATIENT)
Dept: FAMILY MEDICINE CLINIC | Facility: CLINIC | Age: 58
End: 2023-06-20
Payer: MEDICARE

## 2023-06-20 VITALS
OXYGEN SATURATION: 95 % | HEIGHT: 63.5 IN | TEMPERATURE: 98 F | BODY MASS INDEX: 40.07 KG/M2 | DIASTOLIC BLOOD PRESSURE: 72 MMHG | WEIGHT: 229 LBS | RESPIRATION RATE: 20 BRPM | SYSTOLIC BLOOD PRESSURE: 128 MMHG | HEART RATE: 99 BPM

## 2023-06-20 DIAGNOSIS — N30.00 ACUTE CYSTITIS WITHOUT HEMATURIA: Primary | ICD-10-CM

## 2023-06-20 DIAGNOSIS — M79.675 PAIN OF TOE OF LEFT FOOT: ICD-10-CM

## 2023-06-20 DIAGNOSIS — E11.9 CONTROLLED TYPE 2 DIABETES MELLITUS WITHOUT COMPLICATION, WITHOUT LONG-TERM CURRENT USE OF INSULIN (HCC): ICD-10-CM

## 2023-06-20 PROCEDURE — 99214 OFFICE O/P EST MOD 30 MIN: CPT | Performed by: NURSE PRACTITIONER

## 2023-06-20 RX ORDER — ONDANSETRON 4 MG/1
4 TABLET, ORALLY DISINTEGRATING ORAL EVERY 8 HOURS PRN
COMMUNITY
Start: 2023-06-02

## 2023-06-20 RX ORDER — HYDROXYCHLOROQUINE SULFATE 200 MG/1
200 TABLET, FILM COATED ORAL 2 TIMES DAILY
COMMUNITY
Start: 2023-06-04

## 2023-06-20 RX ORDER — CIPROFLOXACIN 500 MG/1
500 TABLET, FILM COATED ORAL 2 TIMES DAILY
Qty: 20 TABLET | Refills: 0 | Status: SHIPPED | OUTPATIENT
Start: 2023-06-20 | End: 2023-06-30

## 2023-06-20 NOTE — PATIENT INSTRUCTIONS
Urinary tract infection cause and prevention discussed. Drink more water, don't hold urine, urinate after intercourse, don't take bubble baths or use scented soaps, don't use powders, keep bowels regular. Follow up if symptoms persist or if you experience flank pain, vomiting, or fever.

## 2023-07-08 NOTE — TELEPHONE ENCOUNTER
Future appt: Your appointments       Date & Time Appointment Department [facility])    Aug 12, 2023  8:15 AM CDT Laboratory Visit with REF 1 Megan Mario Bautista [place] ([facility] [place])        Sep 08, 2023  3:00 PM CDT Medicare Annual Well Visit with MD Romie Oh [place] ([facility])        Sep 28, 2023  3:00 PM CDT Sleep Follow Up with MD Romie Fernandez [place] ([facility])              Mario Sargent [place]  EDW Ref Lab [place]  69 [facility] [address]  [address] [place] [address] [place]  [facility] [phone]  [phone]          Last Appointment with provider:   3/31/2023  Last appointment at [facility] [place]:  6/20/2023  Cholesterol, Total (mg/dL)   Date Value   06/09/2018 163     HDL Cholesterol (mg/dL)   Date Value   06/09/2018 63     LDL Cholesterol (mg/dL)   Date Value   06/09/2018 81     Triglycerides (mg/dL)   Date Value   06/09/2018 94     Lab Results   Component Value Date     (H) 03/11/2023    A1C 7.9 (H) 03/11/2023     Lab Results   Component Value Date    T4F 1.1 06/29/2022    TSH 5.910 (H) 06/29/2022     Last RF:  6/29/2023    No follow-ups on file.

## 2023-07-26 ENCOUNTER — PATIENT OUTREACH (OUTPATIENT)
Dept: CASE MANAGEMENT | Age: 58
End: 2023-07-26

## 2023-08-12 ENCOUNTER — LABORATORY ENCOUNTER (OUTPATIENT)
Dept: LAB | Age: 58
End: 2023-08-12
Attending: FAMILY MEDICINE
Payer: MEDICARE

## 2023-08-12 DIAGNOSIS — E66.01 CLASS 3 SEVERE OBESITY DUE TO EXCESS CALORIES WITH SERIOUS COMORBIDITY AND BODY MASS INDEX (BMI) OF 60.0 TO 69.9 IN ADULT (HCC): ICD-10-CM

## 2023-08-12 DIAGNOSIS — G25.81 RLS (RESTLESS LEGS SYNDROME): ICD-10-CM

## 2023-08-12 DIAGNOSIS — R79.9 ABNORMAL FINDING OF BLOOD CHEMISTRY, UNSPECIFIED: ICD-10-CM

## 2023-08-12 DIAGNOSIS — E11.9 CONTROLLED TYPE 2 DIABETES MELLITUS WITHOUT COMPLICATION, WITHOUT LONG-TERM CURRENT USE OF INSULIN (HCC): ICD-10-CM

## 2023-08-12 DIAGNOSIS — E66.01 CLASS 3 SEVERE OBESITY DUE TO EXCESS CALORIES WITH SERIOUS COMORBIDITY AND BODY MASS INDEX (BMI) OF 50.0 TO 59.9 IN ADULT (HCC): ICD-10-CM

## 2023-08-12 DIAGNOSIS — E08.22 DIABETES MELLITUS DUE TO UNDERLYING CONDITION WITH CHRONIC KIDNEY DISEASE, WITHOUT LONG-TERM CURRENT USE OF INSULIN, UNSPECIFIED CKD STAGE (HCC): ICD-10-CM

## 2023-08-12 LAB
ALBUMIN SERPL-MCNC: 3.9 G/DL (ref 3.4–5)
ALBUMIN/GLOB SERPL: 1 {RATIO} (ref 1–2)
ALP LIVER SERPL-CCNC: 84 U/L
ALT SERPL-CCNC: 35 U/L
ANION GAP SERPL CALC-SCNC: 5 MMOL/L (ref 0–18)
AST SERPL-CCNC: 25 U/L (ref 15–37)
BASOPHILS # BLD AUTO: 0.08 X10(3) UL (ref 0–0.2)
BASOPHILS NFR BLD AUTO: 0.9 %
BILIRUB SERPL-MCNC: 0.4 MG/DL (ref 0.1–2)
BUN BLD-MCNC: 18 MG/DL (ref 7–18)
CALCIUM BLD-MCNC: 9.2 MG/DL (ref 8.5–10.1)
CHLORIDE SERPL-SCNC: 106 MMOL/L (ref 98–112)
CHOLEST SERPL-MCNC: 162 MG/DL (ref ?–200)
CO2 SERPL-SCNC: 27 MMOL/L (ref 21–32)
CREAT BLD-MCNC: 1.55 MG/DL
DEPRECATED HBV CORE AB SER IA-ACNC: 501.3 NG/ML
EGFRCR SERPLBLD CKD-EPI 2021: 38 ML/MIN/1.73M2 (ref 60–?)
EOSINOPHIL # BLD AUTO: 0.39 X10(3) UL (ref 0–0.7)
EOSINOPHIL NFR BLD AUTO: 4.2 %
ERYTHROCYTE [DISTWIDTH] IN BLOOD BY AUTOMATED COUNT: 13.9 %
EST. AVERAGE GLUCOSE BLD GHB EST-MCNC: 157 MG/DL (ref 68–126)
FASTING PATIENT LIPID ANSWER: YES
FASTING STATUS PATIENT QL REPORTED: YES
GLOBULIN PLAS-MCNC: 3.9 G/DL (ref 2.8–4.4)
GLUCOSE BLD-MCNC: 161 MG/DL (ref 70–99)
HBA1C MFR BLD: 7.1 % (ref ?–5.7)
HCT VFR BLD AUTO: 47.3 %
HDLC SERPL-MCNC: 40 MG/DL (ref 40–59)
HGB BLD-MCNC: 14.5 G/DL
IMM GRANULOCYTES # BLD AUTO: 0.08 X10(3) UL (ref 0–1)
IMM GRANULOCYTES NFR BLD: 0.9 %
IRON SATN MFR SERPL: 17 %
IRON SERPL-MCNC: 60 UG/DL
LDLC SERPL CALC-MCNC: 78 MG/DL (ref ?–100)
LYMPHOCYTES # BLD AUTO: 1.89 X10(3) UL (ref 1–4)
LYMPHOCYTES NFR BLD AUTO: 20.4 %
MCH RBC QN AUTO: 28.3 PG (ref 26–34)
MCHC RBC AUTO-ENTMCNC: 30.7 G/DL (ref 31–37)
MCV RBC AUTO: 92.4 FL
MONOCYTES # BLD AUTO: 0.68 X10(3) UL (ref 0.1–1)
MONOCYTES NFR BLD AUTO: 7.4 %
NEUTROPHILS # BLD AUTO: 6.13 X10 (3) UL (ref 1.5–7.7)
NEUTROPHILS # BLD AUTO: 6.13 X10(3) UL (ref 1.5–7.7)
NEUTROPHILS NFR BLD AUTO: 66.2 %
NONHDLC SERPL-MCNC: 122 MG/DL (ref ?–130)
OSMOLALITY SERPL CALC.SUM OF ELEC: 291 MOSM/KG (ref 275–295)
PLATELET # BLD AUTO: 234 10(3)UL (ref 150–450)
POTASSIUM SERPL-SCNC: 4.5 MMOL/L (ref 3.5–5.1)
PROT SERPL-MCNC: 7.8 G/DL (ref 6.4–8.2)
RBC # BLD AUTO: 5.12 X10(6)UL
SODIUM SERPL-SCNC: 138 MMOL/L (ref 136–145)
TIBC SERPL-MCNC: 356 UG/DL (ref 240–450)
TRANSFERRIN SERPL-MCNC: 239 MG/DL (ref 200–360)
TRIGL SERPL-MCNC: 268 MG/DL (ref 30–149)
TSI SER-ACNC: 4.73 MIU/ML (ref 0.36–3.74)
URATE SERPL-MCNC: 9.3 MG/DL
VLDLC SERPL CALC-MCNC: 42 MG/DL (ref 0–30)
WBC # BLD AUTO: 9.3 X10(3) UL (ref 4–11)

## 2023-08-12 PROCEDURE — 84550 ASSAY OF BLOOD/URIC ACID: CPT

## 2023-08-12 PROCEDURE — 82728 ASSAY OF FERRITIN: CPT

## 2023-08-12 PROCEDURE — 83550 IRON BINDING TEST: CPT

## 2023-08-12 PROCEDURE — 80053 COMPREHEN METABOLIC PANEL: CPT

## 2023-08-12 PROCEDURE — 83540 ASSAY OF IRON: CPT

## 2023-08-12 PROCEDURE — 80061 LIPID PANEL: CPT

## 2023-08-12 PROCEDURE — 84443 ASSAY THYROID STIM HORMONE: CPT

## 2023-08-12 PROCEDURE — 85025 COMPLETE CBC W/AUTO DIFF WBC: CPT

## 2023-08-12 PROCEDURE — 83036 HEMOGLOBIN GLYCOSYLATED A1C: CPT

## 2023-08-12 PROCEDURE — 36415 COLL VENOUS BLD VENIPUNCTURE: CPT

## 2023-08-14 ENCOUNTER — TELEPHONE (OUTPATIENT)
Dept: FAMILY MEDICINE CLINIC | Facility: CLINIC | Age: 58
End: 2023-08-14

## 2023-08-15 ENCOUNTER — LAB ENCOUNTER (OUTPATIENT)
Dept: LAB | Age: 58
End: 2023-08-15
Attending: FAMILY MEDICINE
Payer: MEDICARE

## 2023-08-15 DIAGNOSIS — E66.01 CLASS 3 SEVERE OBESITY DUE TO EXCESS CALORIES WITH SERIOUS COMORBIDITY AND BODY MASS INDEX (BMI) OF 60.0 TO 69.9 IN ADULT (HCC): ICD-10-CM

## 2023-08-15 LAB
CREAT UR-SCNC: 155 MG/DL
MICROALBUMIN UR-MCNC: 0.62 MG/DL
MICROALBUMIN/CREAT 24H UR-RTO: 4 UG/MG (ref ?–30)

## 2023-08-15 PROCEDURE — 82043 UR ALBUMIN QUANTITATIVE: CPT

## 2023-08-15 PROCEDURE — 82570 ASSAY OF URINE CREATININE: CPT

## 2023-08-21 RX ORDER — MONTELUKAST SODIUM 10 MG/1
10 TABLET ORAL DAILY
Qty: 90 TABLET | Refills: 3 | Status: SHIPPED | OUTPATIENT
Start: 2023-08-21

## 2023-08-21 NOTE — TELEPHONE ENCOUNTER
Last refill- 06/29/22 90 tablet 3 refills. Last visit- 06/20/23       Future Appointments   Date Time Provider Marlene Bronson   9/8/2023  3:00 PM Coral Diaz MD [facility] [place]   9/28/2023  3:00 PM Luciana Krabbe, MD [facility] [place] [facility] [place]     Future appt: Your appointments       Date & Time Appointment Department [facility])    Sep 08, 2023  3:00 PM CDT Medicare Annual Well Visit with Coral Diaz MD [address] [facility] [place])        Sep 28, 2023  3:00 PM CDT Sleep Follow Up with Luciana Krabbe, MD [address] [facility] [place])              [address] [facility] [place]  [place] [address] [phone]  [phone]          Last Appointment with provider:   3/31/2023  Last appointment at [facility]:  6/20/2023  Cholesterol, Total (mg/dL)   Date Value   08/12/2023 162     HDL Cholesterol (mg/dL)   Date Value   08/12/2023 40     LDL Cholesterol (mg/dL)   Date Value   08/12/2023 78     Triglycerides (mg/dL)   Date Value   08/12/2023 268 (H)     Lab Results   Component Value Date     (H) 08/12/2023    A1C 7.1 (H) 08/12/2023     Lab Results   Component Value Date    T4F 1.1 06/29/2022    TSH 4.730 (H) 08/12/2023       No follow-ups on file.

## 2023-09-05 NOTE — TELEPHONE ENCOUNTER
Esomeprazole: 9/12/22    Future appt: Your appointments       Date & Time Appointment Department [facility])    Sep 08, 2023  3:00 PM CDT Medicare Annual Well Visit with Kathren Severe, MD [address] [facility] [place] ([facility])        Sep 27, 2023  3:30 PM CDT Sleep Follow Up with [address] [place] [address], [facility] [place], [place] ([place])    Sleep Patients:  Please bring your PAP device (no mask/hose) to each appointment unless instructed otherwise. [address] [facility] [place]  [place] [phone]  [phone]          Last Appointment with provider:   3/31/2023  [facility] appointment at [facility] [place]:  6/20/2023  Cholesterol, Total (mg/dL)   Date Value   08/12/2023 162     HDL Cholesterol (mg/dL)   Date Value   08/12/2023 40     LDL Cholesterol (mg/dL)   Date Value   08/12/2023 78     Triglycerides (mg/dL)   Date Value   08/12/2023 268 (H)     Lab Results   Component Value Date     (H) 08/12/2023    A1C 7.1 (H) 08/12/2023     Lab Results   Component Value Date    T4F 1.1 06/29/2022    TSH 4.730 (H) 08/12/2023       No follow-ups on file.

## 2023-09-08 ENCOUNTER — OFFICE VISIT (OUTPATIENT)
Dept: FAMILY MEDICINE CLINIC | Facility: CLINIC | Age: 58
End: 2023-09-08
Payer: MEDICARE

## 2023-09-08 ENCOUNTER — PATIENT MESSAGE (OUTPATIENT)
Dept: FAMILY MEDICINE CLINIC | Facility: CLINIC | Age: 58
End: 2023-09-08

## 2023-09-08 VITALS
WEIGHT: 293 LBS | RESPIRATION RATE: 18 BRPM | TEMPERATURE: 97 F | SYSTOLIC BLOOD PRESSURE: 122 MMHG | OXYGEN SATURATION: 97 % | HEIGHT: 63.5 IN | HEART RATE: 94 BPM | DIASTOLIC BLOOD PRESSURE: 68 MMHG | BODY MASS INDEX: 51.27 KG/M2

## 2023-09-08 DIAGNOSIS — Z90.710 S/P TAH-BSO (TOTAL ABDOMINAL HYSTERECTOMY AND BILATERAL SALPINGO-OOPHORECTOMY): ICD-10-CM

## 2023-09-08 DIAGNOSIS — M54.41 CHRONIC BILATERAL LOW BACK PAIN WITH RIGHT-SIDED SCIATICA: ICD-10-CM

## 2023-09-08 DIAGNOSIS — E78.49 FAMILIAL MULTIPLE LIPOPROTEIN-TYPE HYPERLIPIDEMIA: ICD-10-CM

## 2023-09-08 DIAGNOSIS — Z23 NEED FOR VACCINATION: ICD-10-CM

## 2023-09-08 DIAGNOSIS — E66.01 CLASS 3 SEVERE OBESITY DUE TO EXCESS CALORIES WITH SERIOUS COMORBIDITY AND BODY MASS INDEX (BMI) OF 50.0 TO 59.9 IN ADULT (HCC): ICD-10-CM

## 2023-09-08 DIAGNOSIS — F33.1 MODERATE EPISODE OF RECURRENT MAJOR DEPRESSIVE DISORDER (HCC): ICD-10-CM

## 2023-09-08 DIAGNOSIS — G89.29 CHRONIC BILATERAL LOW BACK PAIN WITH RIGHT-SIDED SCIATICA: ICD-10-CM

## 2023-09-08 DIAGNOSIS — Z00.00 ENCOUNTER FOR ANNUAL HEALTH EXAMINATION: Primary | ICD-10-CM

## 2023-09-08 DIAGNOSIS — E11.9 CONTROLLED TYPE 2 DIABETES MELLITUS WITHOUT COMPLICATION, WITHOUT LONG-TERM CURRENT USE OF INSULIN (HCC): ICD-10-CM

## 2023-09-08 DIAGNOSIS — Z90.79 S/P TAH-BSO (TOTAL ABDOMINAL HYSTERECTOMY AND BILATERAL SALPINGO-OOPHORECTOMY): ICD-10-CM

## 2023-09-08 DIAGNOSIS — K59.04 CHRONIC IDIOPATHIC CONSTIPATION: ICD-10-CM

## 2023-09-08 DIAGNOSIS — J45.40 MODERATE PERSISTENT ASTHMA WITHOUT COMPLICATION: ICD-10-CM

## 2023-09-08 DIAGNOSIS — M05.79 RHEUMATOID ARTHRITIS INVOLVING MULTIPLE SITES WITH POSITIVE RHEUMATOID FACTOR (HCC): ICD-10-CM

## 2023-09-08 DIAGNOSIS — M15.9 PRIMARY OSTEOARTHRITIS INVOLVING MULTIPLE JOINTS: ICD-10-CM

## 2023-09-08 DIAGNOSIS — J30.1 SEASONAL ALLERGIC RHINITIS DUE TO POLLEN: ICD-10-CM

## 2023-09-08 DIAGNOSIS — I83.93 ASYMPTOMATIC VARICOSE VEINS OF BOTH LOWER EXTREMITIES: ICD-10-CM

## 2023-09-08 DIAGNOSIS — G47.33 OBSTRUCTIVE SLEEP APNEA SYNDROME: ICD-10-CM

## 2023-09-08 DIAGNOSIS — Z12.31 SCREENING MAMMOGRAM FOR BREAST CANCER: ICD-10-CM

## 2023-09-08 DIAGNOSIS — E55.9 VITAMIN D DEFICIENCY: ICD-10-CM

## 2023-09-08 DIAGNOSIS — F23 BRIEF PSYCHOTIC DISORDER (HCC): ICD-10-CM

## 2023-09-08 DIAGNOSIS — Z90.722 S/P TAH-BSO (TOTAL ABDOMINAL HYSTERECTOMY AND BILATERAL SALPINGO-OOPHORECTOMY): ICD-10-CM

## 2023-09-08 DIAGNOSIS — N18.32 STAGE 3B CHRONIC KIDNEY DISEASE (HCC): ICD-10-CM

## 2023-09-08 DIAGNOSIS — E11.9 CONTROLLED TYPE 2 DIABETES MELLITUS WITHOUT COMPLICATION, WITHOUT LONG-TERM CURRENT USE OF INSULIN (HCC): Primary | ICD-10-CM

## 2023-09-08 DIAGNOSIS — N39.0 FREQUENT UTI: ICD-10-CM

## 2023-09-08 PROBLEM — R60.9 FLUID RETENTION: Status: RESOLVED | Noted: 2019-03-04 | Resolved: 2023-09-08

## 2023-09-08 PROCEDURE — G0439 PPPS, SUBSEQ VISIT: HCPCS | Performed by: FAMILY MEDICINE

## 2023-09-08 PROCEDURE — 90677 PCV20 VACCINE IM: CPT | Performed by: FAMILY MEDICINE

## 2023-09-08 PROCEDURE — 90686 IIV4 VACC NO PRSV 0.5 ML IM: CPT | Performed by: FAMILY MEDICINE

## 2023-09-08 PROCEDURE — 99213 OFFICE O/P EST LOW 20 MIN: CPT | Performed by: FAMILY MEDICINE

## 2023-09-08 PROCEDURE — G0009 ADMIN PNEUMOCOCCAL VACCINE: HCPCS | Performed by: FAMILY MEDICINE

## 2023-09-08 PROCEDURE — G0008 ADMIN INFLUENZA VIRUS VAC: HCPCS | Performed by: FAMILY MEDICINE

## 2023-09-08 RX ORDER — DOXEPIN HYDROCHLORIDE 25 MG/1
25 CAPSULE ORAL NIGHTLY PRN
COMMUNITY
Start: 2023-08-09

## 2023-09-08 NOTE — PATIENT INSTRUCTIONS
Flu shot today. Prevnar 20 pneumonia vaccine today. Please schedule a mammogram at [address] [place] [address] in 6 months. Continue the same medications, including Ozempic weekly.

## 2023-09-08 NOTE — PROGRESS NOTES
Pt received flu vaccine to left deltoid and prevnar 20 vaccine to right deltoid. Pt tolerated vaccines well. No redness, swelling or irritation to injection site. Pt left in stable condition without incident.

## 2023-09-09 NOTE — TELEPHONE ENCOUNTER
From: Linnea Wisdom  To: Delroy Arriaza MD  Sent: 9/8/2023 4:59 PM CDT  Subject: Blood work    Should I have blood work done before the 6 month diabetes check?

## 2023-09-11 RX ORDER — ESTRADIOL 1 MG/1
1 TABLET ORAL DAILY
Qty: 90 TABLET | Refills: 3 | Status: SHIPPED | OUTPATIENT
Start: 2023-09-11

## 2023-09-11 NOTE — TELEPHONE ENCOUNTER
Estradiol: 6/29/22    Future appt: Your appointments       Date & Time Appointment Department [facility])    Sep 27, 2023  3:30 PM CDT Sleep Follow Up with Mekhi Duran [place] ([place])    Sleep Patients:  Please bring your PAP device (no mask/hose) to each appointment unless instructed otherwise. Mar 08, 2024  3:30 PM CST Follow up - Extended with Lay Mills MD [address], [address] [place] ([place])              [address] [facility] [place]  [place] [address] [phone]  [phone]          Last Appointment with provider:   9/8/2023  Last appointment at [facility] [place]:  9/8/2023  Cholesterol, Total (mg/dL)   Date Value   08/12/2023 162     HDL Cholesterol (mg/dL)   Date Value   08/12/2023 40     LDL Cholesterol (mg/dL)   Date Value   08/12/2023 78     Triglycerides (mg/dL)   Date Value   08/12/2023 268 (H)     Lab Results   Component Value Date     (H) 08/12/2023    A1C 7.1 (H) 08/12/2023     Lab Results   Component Value Date    T4F 1.1 06/29/2022    TSH 4.730 (H) 08/12/2023       No follow-ups on file.

## 2023-09-18 RX ORDER — FLUTICASONE FUROATE AND VILANTEROL 200; 25 UG/1; UG/1
1 POWDER RESPIRATORY (INHALATION) DAILY
Qty: 3 EACH | Refills: 3 | Status: SHIPPED | OUTPATIENT
Start: 2023-09-18

## 2023-09-27 ENCOUNTER — OFFICE VISIT (OUTPATIENT)
Dept: FAMILY MEDICINE CLINIC | Facility: CLINIC | Age: 58
End: 2023-09-27
Payer: MEDICARE

## 2023-09-27 VITALS
OXYGEN SATURATION: 96 % | HEIGHT: 63.5 IN | BODY MASS INDEX: 51.27 KG/M2 | DIASTOLIC BLOOD PRESSURE: 66 MMHG | HEART RATE: 93 BPM | TEMPERATURE: 97 F | WEIGHT: 293 LBS | SYSTOLIC BLOOD PRESSURE: 126 MMHG | RESPIRATION RATE: 18 BRPM

## 2023-09-27 DIAGNOSIS — G47.33 OBSTRUCTIVE SLEEP APNEA SYNDROME: ICD-10-CM

## 2023-09-27 DIAGNOSIS — R05.1 ACUTE COUGH: ICD-10-CM

## 2023-09-27 DIAGNOSIS — J02.9 SORE THROAT: Primary | ICD-10-CM

## 2023-09-27 LAB
CONTROL LINE PRESENT WITH A CLEAR BACKGROUND (YES/NO): YES YES/NO
KIT LOT #: 5681 NUMERIC
STREP GRP A CUL-SCR: NEGATIVE

## 2023-09-27 PROCEDURE — 99214 OFFICE O/P EST MOD 30 MIN: CPT | Performed by: NURSE PRACTITIONER

## 2023-09-27 PROCEDURE — 87880 STREP A ASSAY W/OPTIC: CPT | Performed by: NURSE PRACTITIONER

## 2023-09-27 RX ORDER — AZITHROMYCIN 250 MG/1
TABLET, FILM COATED ORAL
Qty: 6 TABLET | Refills: 0 | Status: SHIPPED | OUTPATIENT
Start: 2023-09-27 | End: 2023-10-02

## 2023-09-27 NOTE — PATIENT INSTRUCTIONS
Strep negative. Directed to take antibiotics until gone. Recommend to eat yogurt or take probiotic daily while on antibiotic, but not the same time as the antibiotic. Treat symptoms as needed. Return to clinic if not better in 48-72 hours. Otherwise follow-up as needed. Continue sleep therapy. Follow-up in 6 months - sooner if needed. Advised if still with sleep apnea and not using CPAP has a  7 fold increase in risk of heart attack, stroke, abnormal heart rhythm  and death,  increased risk of driving accidents. Advised to refrain from driving when sleepy. COMPLIANCE is required by insurance for 4 hours a night most nights of the week. Recommend weight loss, and maintain and optimal BMI with Exercise 30 minutes most days of the week to target heart rate . Advised patient to change filters,masks,hoses  and tubes and equiptment on a  regular schedule. Filters and seals shall be changed every 1 month,  Hoses every 3 months,   CPAP mask and humidifier  chamber changed every 6 month  with the Durable medical equipment provider.

## 2024-04-02 NOTE — PROGRESS NOTES
[facility]        HPI:   This is a 62year old female coming in for Patient presents with:  Obstructive Sleep Apnea (FELISA): compliance f/u      HPI:     Patient is present for sleep compliance review.  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    Continue as per established POC    Leeanne Jones M.A., CCC-SLP    4/2/2024       determinant information on file (likely too young). Social History Narrative      Not on file    Social History    Tobacco Use      Smoking status: Never Smoker      Smokeless tobacco: Never Used    Alcohol use:  Yes      Alcohol/week: 0.0 standard drink Rfl: 0  TraMADol HCl 50 MG Oral Tab, Take 1 tablet (50 mg total) by mouth every 4 (four) hours as needed for Pain., Disp: 30 tablet, Rfl: 0  MONTELUKAST SODIUM 10 MG Oral Tab, TAKE 1 TABLET DAILY, Disp: 90 tablet, Rfl: 3  Cholecalciferol (VITAMIN D) 1000 u abdominal hysterectomy and bilateral salpingo-oophorectomy)     Vitamin D deficiency     Benign paroxysmal positional vertigo     Rheumatoid arthritis ([facility] [address]     Sepsis (HCC)     Fluid retention     Asymptomatic varicose veins of both lower extremities Neurological: She is alert and oriented to person, place, and time. Skin: Skin is warm and dry. Psychiatric: She has a normal mood and affect. Her behavior is normal. Judgment and thought content normal.   Nursing note and vitals reviewed.       ASSES Prescriptions      No prescriptions requested or ordered in this encounter       Outcome: Parent verbalizes understanding. Parent is notified to call with any questions, complications, allergies, or worsening or changing symptoms.   Parent is to call with a

## (undated) DIAGNOSIS — R92.8 ABNORMAL MAMMOGRAM: Primary | ICD-10-CM

## (undated) NOTE — MR AVS SNAPSHOT
[address] [place]  [phone]  [phone]               Thank you for choosing us for your health care visit with LAURA Clemons.   We are glad to serve you and happy to provide you with this summary of yo Inhale 2 puffs into the lungs every 4 (four) hours as needed. * albuterol sulfate (2.5 MG/3ML) 0.083% Nebu   Take 2.5 mg by nebulization every 4 (four) hours as needed.    Commonly known as:  VENTOLIN           ASMANEX 30 METERED DOSES 220 MCG/INH Generic drug:  Montelukast Sodium   Take 10 mg by mouth daily. STRATTERA 60 MG Caps   Generic drug:  Atomoxetine HCl   Take 60 mg by mouth daily. TraMADol HCl 50 MG Tabs   Take 50 mg by mouth every 8 (eight) hours as needed.    Commonly

## (undated) NOTE — Clinical Note
Date: 3/10/2017    Patient Name: Vivian Kaba          To Whom it may concern: This letter has been written at the patient's request. The above patient was seen at the [facility] for treatment of a medical condition.     This patient shou

## (undated) NOTE — LETTER
Date: 11/28/2017    Patient Name: Shameka Sanchez          To Whom it may concern: This letter has been written at the patient's request. The above patient was seen at the [facility] for treatment of a medical condition.     This patient satish

## (undated) NOTE — LETTER
19    Re:  Meghan Mcintosh   10/14/1965   #      Dear Andrea Medrano has been my patient for many many years. She was diagnosed with rheumatoid arthritis many years ago.   She has been followed by our local rheumatologist, Dr. Surya Valenzuela hospitalization or her testing done by the rheumatologist.    Question #3:    Rheumatoid arthritis  Asthma  Allergic rhinitis  Sleep apnea  Depression  Vitamin D deficiency    Question #4: Her rheumatoid arthritis is continually and slowly progressive.

## (undated) NOTE — MR AVS SNAPSHOT
[address] [place]  [address] [address] [id number]  [phone]               Thank you for choosing us for your health care visit with Paulino Raines MD.  We are glad to serve you and happy to provide you with this summary o Generic drug:  Carbonyl Iron   Take 45 mg by mouth 2 (two) times daily. Fexofenadine-Pseudoephed -240 MG Tb24   Take 1 tablet by mouth daily.    Commonly known as:  ALLEGRA-D ALLERGY & CONGESTION           Fluticasone Propionate 50 MCG/ACT S [facility] [address] [facility] [address] [place] [address] 23, [phone], [address] [place] [id number]    Hours:  24-hours Phone:  [phone]    - Cefuroxime Axetil 250 MG T

## (undated) NOTE — MR AVS SNAPSHOT
[address] [place]  [address] [phone]  [phone]               Thank you for choosing us for your health care visit with LAURA Garcia.   We are glad to serve you and happy to provide you with this summary of yo Take 2.5 mg by nebulization every 4 (four) hours as needed. Commonly known as:  VENTOLIN           ASMANEX 30 METERED DOSES 220 MCG/INH Aepb   Generic drug:  Mometasone Furoate   Inhale 1 puff into the lungs daily.            Azithromycin 1 % Soln   1 sylvia Take 50 mg by mouth every 8 (eight) hours as needed. Commonly known as:  ULTRAM           WELLBUTRIN  MG Tb24   Generic drug:  BuPROPion HCl ER (XL)   Take 150 mg by mouth 2 (two) times daily. * Notice:   This list has 4 medication(s) that

## (undated) NOTE — MR AVS SNAPSHOT
[address] [place]  [address] [address] [phone]  [phone]               Thank you for choosing us for your health care visit with LAURA Prasad.   We are glad to serve you and happy to provide you with this summary o Norfloxacin     Penicillin G     Phenobarbital     Sulfa Antibiotics                 Today's Vital Signs     BP Pulse Temp Weight          142/98 mmHg 79 99 °F (37.2 °C) (Tympanic) 286 lb 9.6 oz           Current Medications          This list is accurate ondansetron 8 MG Tbdp   Take 8 mg by mouth 3 (three) times daily as needed. Commonly known as:  ZOFRAN-ODT           SINGULAIR 10 MG Tabs   Generic drug:  Montelukast Sodium   Take 10 mg by mouth daily. STRATTERA 60 MG Caps   Generic drug:   At

## (undated) NOTE — MR AVS SNAPSHOT
[address]  [phone]  [phone]               Thank you for choosing us for your health care visit with Savannah Frye MD.  We are glad to serve you and happy to provide you with this summary of yo Commonly known as:  FLONASE           folic acid 1 MG Tabs   Take 1 mg by mouth daily. Commonly known as:  FOLVITE           Hydroxychloroquine Sulfate 200 MG Tabs   Take 200 mg by mouth 2 (two) times daily.    Commonly known as:  PLAQUENIL           LIPI Enter your [employer] Activation Code exactly as it appears below along with your Zip Code and Date of Birth to complete the sign-up process. If you do not sign up before the expiration date, you must request a new code.     Your unique [employer] Access Code: [email or web address] Eat plenty of protein, keep the fat content low Sugars:  sodas and sports drinks, candies and desserts   Eat plenty of low-fat dairy products High fat meats and dairy   Choose whole grain products Foods high in sodium   Water is best for hydration Fast sarah